# Patient Record
Sex: MALE | Race: BLACK OR AFRICAN AMERICAN | NOT HISPANIC OR LATINO | Employment: FULL TIME | URBAN - METROPOLITAN AREA
[De-identification: names, ages, dates, MRNs, and addresses within clinical notes are randomized per-mention and may not be internally consistent; named-entity substitution may affect disease eponyms.]

---

## 2020-08-17 ENCOUNTER — HOSPITAL ENCOUNTER (EMERGENCY)
Facility: HOSPITAL | Age: 30
Discharge: HOME | End: 2020-08-17
Attending: EMERGENCY MEDICINE
Payer: MEDICAID

## 2020-08-17 VITALS
TEMPERATURE: 98 F | HEART RATE: 75 BPM | OXYGEN SATURATION: 98 % | RESPIRATION RATE: 18 BRPM | SYSTOLIC BLOOD PRESSURE: 119 MMHG | WEIGHT: 162.38 LBS | DIASTOLIC BLOOD PRESSURE: 70 MMHG

## 2020-08-17 DIAGNOSIS — M25.562 ACUTE PAIN OF LEFT KNEE: Primary | ICD-10-CM

## 2020-08-17 PROCEDURE — 99283 EMERGENCY DEPT VISIT LOW MDM: CPT

## 2020-08-17 RX ORDER — NAPROXEN 500 MG/1
500 TABLET ORAL 2 TIMES DAILY WITH MEALS
Qty: 20 TABLET | Refills: 0 | Status: SHIPPED | OUTPATIENT
Start: 2020-08-17 | End: 2023-06-11 | Stop reason: ALTCHOICE

## 2020-08-17 NOTE — ED PROVIDER NOTES
History      Chief Complaint   Patient presents with    Knee Pain     c/o L knee pain and swelling for the last week        Review of patient's allergies indicates:  No Known Allergies     HPI   HPI    8/17/2020, 9:03 AM   History obtained from the patient      History of Present Illness: Brooke Harvey is a 29 y.o. male patient who presents to the Emergency Department for left knee pain and swelling for a week.  Denies injury.  Denies fever.  Symptoms are moderate in severity.     No further complaints or concerns at this time.           PCP: Primary Doctor No       Past Medical History:  No past medical history on file.      Past Surgical History:  No past surgical history on file.        Family History:  No family history on file.        Social History:  Social History     Tobacco Use    Smoking status: Not on file   Substance and Sexual Activity    Alcohol use: Not on file    Drug use: Not on file    Sexual activity: Not on file       ROS     Review of Systems   Constitutional: Negative for chills and fever.   HENT: Negative for facial swelling and trouble swallowing.    Eyes: Negative for pain and discharge.   Respiratory: Negative for chest tightness and shortness of breath.    Cardiovascular: Negative for palpitations and leg swelling.   Gastrointestinal: Negative for diarrhea and vomiting.   Endocrine: Negative for polydipsia and polyuria.   Genitourinary: Negative for decreased urine volume and flank pain.   Musculoskeletal: Negative for joint swelling and neck stiffness.   Skin: Negative for rash and wound.   Neurological: Negative for syncope and light-headedness.   All other systems reviewed and are negative.      Physical Exam      Initial Vitals [08/17/20 0855]   BP Pulse Resp Temp SpO2   119/70 75 18 98 °F (36.7 °C) 98 %      MAP       --         Physical Exam  Vital signs and nursing notes reviewed.  Constitutional: Patient is in NAD. Awake and alert. Well-developed and  well-nourished.  Head: Atraumatic. Normocephalic.  Eyes: PERRL. EOM intact. Conjunctivae nl. No scleral icterus.  ENT: Mucous membranes are moist. Oropharynx is clear.  Neck: Supple. No JVD. No lymphadenopathy.  No meningismus  Cardiovascular: Regular rate and rhythm. No murmurs, rubs, or gallops. Distal pulses are 2+ and symmetric.  Pulmonary/Chest: No respiratory distress. Clear to auscultation bilaterally. No wheezing, rales, or rhonchi.  Abdominal: Soft. Non-distended. No TTP. No rebound, guarding, or rigidity. Good bowel sounds.  Genitourinary: No CVA tenderness  Musculoskeletal: Moves all extremities.  Left knee with no erythema, edema or heat.  FROM.  No laxity.  No tenderness.   2+DP pulses.  Hip and ankle with from and nontender.  Skin: Warm and dry.  Neurological: Awake and alert. No acute focal neurological deficits are appreciated.  Psychiatric: Normal affect. Good eye contact. Appropriate in content.      ED Course          Procedures  ED Vital Signs:  Vitals:    08/17/20 0855   BP: 119/70   Pulse: 75   Resp: 18   Temp: 98 °F (36.7 °C)   SpO2: 98%   Weight: 73.6 kg (162 lb 5.9 oz)                 Imaging Results:  Imaging Results    None            The Emergency Provider reviewed the vital signs and test results, which are outlined above.    ED Discussion             Medication(s) given in the ER:  Medications - No data to display        Follow-up Information     [facility] In 3 days.    Contact information:  [address] [place] [address] [phone]                       New Prescriptions    NAPROXEN (NAPROSYN) 500 MG TABLET    Take 1 tablet (500 mg total) by mouth 2 (two) times daily with meals. Prn pain          Medical Decision Making        All findings were reviewed with the patient/family in detail.   All remaining questions and concerns were addressed at that time.  Patient/family has been counseled regarding the need for follow-up as well as the indication to return  to the emergency room should new or worrisome developments occur.        MDM               Clinical Impression:        ICD-10-CM ICD-9-CM   1. Acute pain of left knee  M25.562 719.46             Christine Ramirez PA-C  08/17/20 0906

## 2020-08-18 ENCOUNTER — PES CALL (OUTPATIENT)
Dept: ADMINISTRATIVE | Facility: CLINIC | Age: 30
End: 2020-08-18

## 2021-03-31 ENCOUNTER — IMMUNIZATION (OUTPATIENT)
Dept: INTERNAL MEDICINE | Facility: CLINIC | Age: 31
End: 2021-03-31
Payer: MEDICAID

## 2021-03-31 DIAGNOSIS — Z23 NEED FOR VACCINATION: Primary | ICD-10-CM

## 2021-03-31 PROCEDURE — 91300 COVID-19, MRNA, LNP-S, PF, 30 MCG/0.3 ML DOSE VACCINE: CPT | Mod: PBBFAC | Performed by: FAMILY MEDICINE

## 2021-04-21 ENCOUNTER — IMMUNIZATION (OUTPATIENT)
Dept: INTERNAL MEDICINE | Facility: CLINIC | Age: 31
End: 2021-04-21
Payer: MEDICAID

## 2021-04-21 DIAGNOSIS — Z23 NEED FOR VACCINATION: Primary | ICD-10-CM

## 2021-04-21 PROCEDURE — 0002A COVID-19, MRNA, LNP-S, PF, 30 MCG/0.3 ML DOSE VACCINE: CPT | Mod: PBBFAC

## 2021-04-21 PROCEDURE — 91300 COVID-19, MRNA, LNP-S, PF, 30 MCG/0.3 ML DOSE VACCINE: CPT | Mod: PBBFAC

## 2022-01-27 ENCOUNTER — HISTORICAL (OUTPATIENT)
Dept: RADIOLOGY | Facility: HOSPITAL | Age: 32
End: 2022-01-27

## 2022-02-02 ENCOUNTER — HISTORICAL (OUTPATIENT)
Dept: ADMINISTRATIVE | Facility: HOSPITAL | Age: 32
End: 2022-02-02

## 2022-02-09 ENCOUNTER — HISTORICAL (OUTPATIENT)
Dept: ADMINISTRATIVE | Facility: HOSPITAL | Age: 32
End: 2022-02-09

## 2022-07-16 ENCOUNTER — HOSPITAL ENCOUNTER (EMERGENCY)
Facility: HOSPITAL | Age: 32
Discharge: HOME | End: 2022-07-17
Attending: FAMILY MEDICINE
Payer: MEDICAID

## 2022-07-16 DIAGNOSIS — N30.00 ACUTE CYSTITIS WITHOUT HEMATURIA: Primary | ICD-10-CM

## 2022-07-16 DIAGNOSIS — K62.5 RECTAL BLEEDING: ICD-10-CM

## 2022-07-16 LAB
ALBUMIN SERPL-MCNC: 3.9 GM/DL (ref 3.5–5)
ALBUMIN/GLOB SERPL: 1 RATIO (ref 1.1–2)
ALP SERPL-CCNC: 112 UNIT/L (ref 40–150)
ALT SERPL-CCNC: 44 UNIT/L (ref 0–55)
APTT PPP: 31 SECONDS
AST SERPL-CCNC: 50 UNIT/L (ref 5–34)
BASOPHILS # BLD AUTO: 0.02 X10(3)/MCL (ref 0–0.2)
BASOPHILS NFR BLD AUTO: 0.3 %
BILIRUBIN DIRECT+TOT PNL SERPL-MCNC: 0.5 MG/DL
BUN SERPL-MCNC: 11.8 MG/DL (ref 8.9–20.6)
CALCIUM SERPL-MCNC: 9.7 MG/DL (ref 8.4–10.2)
CHLORIDE SERPL-SCNC: 103 MMOL/L (ref 98–107)
CO2 SERPL-SCNC: 27 MMOL/L (ref 22–29)
CREAT SERPL-MCNC: 1.19 MG/DL (ref 0.73–1.18)
EOSINOPHIL # BLD AUTO: 0.27 X10(3)/MCL (ref 0–0.9)
EOSINOPHIL NFR BLD AUTO: 3.9 %
ERYTHROCYTE [DISTWIDTH] IN BLOOD BY AUTOMATED COUNT: 12.8 % (ref 11.5–17)
GLOBULIN SER-MCNC: 4 GM/DL (ref 2.4–3.5)
GLUCOSE SERPL-MCNC: 94 MG/DL (ref 74–100)
HCT VFR BLD AUTO: 43.8 % (ref 42–52)
HGB BLD-MCNC: 13.3 GM/DL (ref 14–18)
IMM GRANULOCYTES # BLD AUTO: 0.02 X10(3)/MCL (ref 0–0.04)
IMM GRANULOCYTES NFR BLD AUTO: 0.3 %
LYMPHOCYTES # BLD AUTO: 2.09 X10(3)/MCL (ref 0.6–4.6)
LYMPHOCYTES NFR BLD AUTO: 30 %
MCH RBC QN AUTO: 25.8 PG (ref 27–31)
MCHC RBC AUTO-ENTMCNC: 30.4 MG/DL (ref 33–36)
MCV RBC AUTO: 84.9 FL (ref 80–94)
MONOCYTES # BLD AUTO: 0.49 X10(3)/MCL (ref 0.1–1.3)
MONOCYTES NFR BLD AUTO: 7 %
NEUTROPHILS # BLD AUTO: 4.1 X10(3)/MCL (ref 2.1–9.2)
NEUTROPHILS NFR BLD AUTO: 58.5 %
NRBC BLD AUTO-RTO: 0 %
PLATELET # BLD AUTO: 239 X10(3)/MCL (ref 130–400)
PMV BLD AUTO: 10.5 FL (ref 7.4–10.4)
POTASSIUM SERPL-SCNC: 4 MMOL/L (ref 3.5–5.1)
PROT SERPL-MCNC: 7.9 GM/DL (ref 6.4–8.3)
RBC # BLD AUTO: 5.16 X10(6)/MCL (ref 4.7–6.1)
SODIUM SERPL-SCNC: 142 MMOL/L (ref 136–145)
WBC # SPEC AUTO: 7 X10(3)/MCL (ref 4.5–11.5)

## 2022-07-16 PROCEDURE — 85610 PROTHROMBIN TIME: CPT | Performed by: FAMILY MEDICINE

## 2022-07-16 PROCEDURE — 36415 COLL VENOUS BLD VENIPUNCTURE: CPT | Performed by: FAMILY MEDICINE

## 2022-07-16 PROCEDURE — 25000003 PHARM REV CODE 250: Performed by: FAMILY MEDICINE

## 2022-07-16 PROCEDURE — 96360 HYDRATION IV INFUSION INIT: CPT | Mod: 59

## 2022-07-16 PROCEDURE — 85025 COMPLETE CBC W/AUTO DIFF WBC: CPT | Performed by: FAMILY MEDICINE

## 2022-07-16 PROCEDURE — 99285 EMERGENCY DEPT VISIT HI MDM: CPT | Mod: 25

## 2022-07-16 PROCEDURE — 85730 THROMBOPLASTIN TIME PARTIAL: CPT | Performed by: FAMILY MEDICINE

## 2022-07-16 PROCEDURE — 80053 COMPREHEN METABOLIC PANEL: CPT | Performed by: FAMILY MEDICINE

## 2022-07-16 RX ADMIN — SODIUM CHLORIDE 1000 ML: 9 INJECTION, SOLUTION INTRAVENOUS at 11:07

## 2022-07-17 VITALS
BODY MASS INDEX: 26.73 KG/M2 | SYSTOLIC BLOOD PRESSURE: 131 MMHG | DIASTOLIC BLOOD PRESSURE: 86 MMHG | HEIGHT: 68 IN | WEIGHT: 176.38 LBS | OXYGEN SATURATION: 99 % | RESPIRATION RATE: 20 BRPM | HEART RATE: 94 BPM | TEMPERATURE: 99 F

## 2022-07-17 VITALS
BODY MASS INDEX: 26.73 KG/M2 | TEMPERATURE: 97 F | HEIGHT: 68 IN | DIASTOLIC BLOOD PRESSURE: 82 MMHG | SYSTOLIC BLOOD PRESSURE: 137 MMHG | RESPIRATION RATE: 20 BRPM | OXYGEN SATURATION: 97 % | WEIGHT: 176.38 LBS | HEART RATE: 98 BPM

## 2022-07-17 DIAGNOSIS — R10.32 LEFT LOWER QUADRANT ABDOMINAL PAIN: Primary | ICD-10-CM

## 2022-07-17 DIAGNOSIS — N30.00 ACUTE CYSTITIS WITHOUT HEMATURIA: ICD-10-CM

## 2022-07-17 LAB
AMPHET UR QL SCN: NEGATIVE
APPEARANCE UR: CLEAR
BACTERIA #/AREA URNS AUTO: ABNORMAL /HPF
BARBITURATE SCN PRESENT UR: NEGATIVE
BENZODIAZ UR QL SCN: NEGATIVE
BILIRUB UR QL STRIP.AUTO: NEGATIVE MG/DL
CANNABINOIDS UR QL SCN: NEGATIVE
COCAINE UR QL SCN: NEGATIVE
COLOR UR AUTO: YELLOW
FENTANYL UR QL SCN: NEGATIVE
GLUCOSE UR QL STRIP.AUTO: NORMAL MG/DL
HYALINE CASTS #/AREA URNS LPF: ABNORMAL /LPF
KETONES UR QL STRIP.AUTO: NEGATIVE MG/DL
LEUKOCYTE ESTERASE UR QL STRIP.AUTO: 500 UNIT/L
MDMA UR QL SCN: NEGATIVE
MUCOUS THREADS URNS QL MICRO: ABNORMAL /LPF
NITRITE UR QL STRIP.AUTO: NEGATIVE
OPIATES UR QL SCN: NEGATIVE
PCP UR QL: NEGATIVE
PH UR STRIP.AUTO: 6.5 [PH]
PH UR: 6.5 [PH] (ref 3–11)
PROT UR QL STRIP.AUTO: ABNORMAL MG/DL
RBC #/AREA URNS AUTO: ABNORMAL /HPF
RBC UR QL AUTO: NEGATIVE UNIT/L
SP GR UR STRIP.AUTO: 1.02
SPECIFIC GRAVITY, URINE AUTO (.000) (OHS): 1.02 (ref 1–1.03)
SQUAMOUS #/AREA URNS LPF: ABNORMAL /HPF
UROBILINOGEN UR STRIP-ACNC: ABNORMAL MG/DL
WBC #/AREA URNS AUTO: ABNORMAL /HPF

## 2022-07-17 PROCEDURE — 81001 URINALYSIS AUTO W/SCOPE: CPT | Performed by: FAMILY MEDICINE

## 2022-07-17 PROCEDURE — 63600175 PHARM REV CODE 636 W HCPCS: Performed by: FAMILY MEDICINE

## 2022-07-17 PROCEDURE — 25500020 PHARM REV CODE 255: Performed by: FAMILY MEDICINE

## 2022-07-17 PROCEDURE — 99284 EMERGENCY DEPT VISIT MOD MDM: CPT | Mod: 25

## 2022-07-17 PROCEDURE — 87077 CULTURE AEROBIC IDENTIFY: CPT | Performed by: FAMILY MEDICINE

## 2022-07-17 PROCEDURE — 25000003 PHARM REV CODE 250

## 2022-07-17 PROCEDURE — 96372 THER/PROPH/DIAG INJ SC/IM: CPT | Performed by: FAMILY MEDICINE

## 2022-07-17 PROCEDURE — 80307 DRUG TEST PRSMV CHEM ANLYZR: CPT | Performed by: FAMILY MEDICINE

## 2022-07-17 RX ORDER — CEFTRIAXONE 500 MG/1
500 INJECTION, POWDER, FOR SOLUTION INTRAMUSCULAR; INTRAVENOUS
Status: COMPLETED | OUTPATIENT
Start: 2022-07-17 | End: 2022-07-17

## 2022-07-17 RX ORDER — NAPROXEN 500 MG/1
500 TABLET ORAL 2 TIMES DAILY PRN
Qty: 20 TABLET | Refills: 0 | Status: SHIPPED | OUTPATIENT
Start: 2022-07-17 | End: 2022-07-27

## 2022-07-17 RX ORDER — MAG HYDROX/ALUMINUM HYD/SIMETH 200-200-20
30 SUSPENSION, ORAL (FINAL DOSE FORM) ORAL
Status: DISCONTINUED | OUTPATIENT
Start: 2022-07-17 | End: 2022-07-17 | Stop reason: HOSPADM

## 2022-07-17 RX ORDER — LIDOCAINE HYDROCHLORIDE 20 MG/ML
INJECTION, SOLUTION EPIDURAL; INFILTRATION; INTRACAUDAL; PERINEURAL
Status: COMPLETED
Start: 2022-07-17 | End: 2022-07-17

## 2022-07-17 RX ORDER — DOXYCYCLINE 100 MG/1
100 CAPSULE ORAL 2 TIMES DAILY
Qty: 14 CAPSULE | Refills: 0 | Status: SHIPPED | OUTPATIENT
Start: 2022-07-17 | End: 2022-07-24

## 2022-07-17 RX ORDER — LIDOCAINE HYDROCHLORIDE 20 MG/ML
10 SOLUTION OROPHARYNGEAL
Status: DISCONTINUED | OUTPATIENT
Start: 2022-07-17 | End: 2022-07-17 | Stop reason: HOSPADM

## 2022-07-17 RX ORDER — DICYCLOMINE HYDROCHLORIDE 10 MG/ML
20 INJECTION INTRAMUSCULAR
Status: DISCONTINUED | OUTPATIENT
Start: 2022-07-17 | End: 2022-07-17 | Stop reason: HOSPADM

## 2022-07-17 RX ADMIN — CEFTRIAXONE SODIUM 500 MG: 500 INJECTION, POWDER, FOR SOLUTION INTRAMUSCULAR; INTRAVENOUS at 01:07

## 2022-07-17 RX ADMIN — LIDOCAINE HYDROCHLORIDE 100 MG: 20 INJECTION, SOLUTION EPIDURAL; INFILTRATION; INTRACAUDAL; PERINEURAL at 01:07

## 2022-07-17 RX ADMIN — IOPAMIDOL 100 ML: 755 INJECTION, SOLUTION INTRAVENOUS at 12:07

## 2022-07-17 NOTE — ED PROVIDER NOTES
Encounter Date: 7/17/2022       History     Chief Complaint   Patient presents with    Abdominal Pain     Pt reports riding his bike home after being dc'd earlier tonight and reports vomiting and abd pain, pt called [facility] and is back, vss.      31-year-old gentleman presents emergency room with left lower quadrant abdominal pain.  Patient was just discharged the emergency room, but reports that the pain returned, so therefore wanted to be re-evaluated.  Currently reports pain is moderate.  No further bloody bowel movements.  Currently in no acute distress.        Review of patient's allergies indicates:  No Known Allergies  Past Medical History:   Diagnosis Date    Anxiety     Depression      No past surgical history on file.  No family history on file.  Social History     Tobacco Use    Smoking status: Never Smoker    Smokeless tobacco: Never Used   Substance Use Topics    Alcohol use: Never    Drug use: Never     Review of Systems   Constitutional: Negative for chills, fatigue and fever.   HENT: Negative for ear pain, rhinorrhea and sore throat.    Eyes: Negative for photophobia and pain.   Respiratory: Negative for cough, shortness of breath and wheezing.    Cardiovascular: Negative for chest pain.   Gastrointestinal: Positive for abdominal pain and nausea. Negative for diarrhea and vomiting.   Genitourinary: Negative for dysuria.   Neurological: Negative for dizziness, weakness and headaches.   All other systems reviewed and are negative.      Physical Exam     Initial Vitals [07/17/22 0320]   BP Pulse Resp Temp SpO2   137/82 98 20 97 °F (36.1 °C) 97 %      MAP       --         Physical Exam    Nursing note and vitals reviewed.  Constitutional: He appears well-developed and well-nourished.   HENT:   Head: Normocephalic and atraumatic.   Eyes: EOM are normal. Pupils are equal, round, and reactive to light.   Neck: Neck supple.   Normal range of motion.  Cardiovascular: Normal rate, regular rhythm and  normal heart sounds. Exam reveals no gallop and no friction rub.    No murmur heard.  Pulmonary/Chest: Breath sounds normal. No respiratory distress.   Abdominal: Abdomen is soft. Bowel sounds are normal. He exhibits no distension. There is no abdominal tenderness.   Very mild left lower quadrant abdominal tenderness, no rebound or guarding   Musculoskeletal:         General: Normal range of motion.      Cervical back: Normal range of motion and neck supple.     Neurological: He is alert and oriented to person, place, and time. He has normal strength.   Skin: Skin is warm and dry.   Psychiatric: He has a normal mood and affect. His behavior is normal. Judgment and thought content normal.         ED Course   Procedures  Labs Reviewed - No data to display       Imaging Results    None          Medications   aluminum-magnesium hydroxide-simethicone 200-200-20 mg/5 mL suspension 30 mL (has no administration in time range)   LIDOcaine HCl 2% oral solution 10 mL (has no administration in time range)   dicyclomine injection 20 mg (has no administration in time range)     Medical Decision Making:   Initial Assessment:   Discussed the patient regarding the findings.  No acute abnormalities noted on physical examination with the exception of mild left lower quadrant abdominal tenderness.  No rebound or guarding.  Patient was just discharged had a full set of laboratory evaluation and also a CT scan which was considered normal.  Discussed with the patient, and patient reports that he just wants some additional medicine.  Will give patient a dose of Bentyl here in the emergency room.  Patient also being treated for urinary tract infection, possibly from gonorrhea/chlamydia.  Given Rocephin here in the emergency room, and has a prescription for the doxycycline.  Patient reports understanding.                      Clinical Impression:   Final diagnoses:  [R10.32] Left lower quadrant abdominal pain (Primary)  [N30.00] Acute  cystitis without hematuria          ED Disposition Condition    Discharge Stable        ED Prescriptions     None        Follow-up Information     Follow up With Specialties Details Why Contact Info    [facility] - Emergency Dept Emergency Medicine  As needed, If symptoms worsen [address] [facility] [address] [phone]    Primary Care Physician  In 5 days             Titus Stoddard MD  07/17/22 9000

## 2022-07-17 NOTE — ED PROVIDER NOTES
"Encounter Date: 7/16/2022       History     Chief Complaint   Patient presents with    GI Problem     Pt reports passing blood while having a bowel movement around 2115 this evening, reports " a larger amount, when I saw it I passed out", pt reports he was found by his roommate and then came here, pt reports he did not hit his head or fall when he passed out but was able to get to the floor. Pt reports this has happened before several years ago, pt vss.     Dysuria     Pt reports unable to urinate, this began at the same time he had the bm.      31-year-old gentleman presents emergency room with complaints of GI bleeding.  Patient reports that he began noticing bleeding last night when having a bowel movement.  Patient has not had another bowel movement until tonight (not having multiple bowel movements per day).  Patient reports not seen any blood clots, but only seen red in the toilet bowl.  Patient denies any chronic medical problems.  Does report a history of back surgery in the past.  Patient reports that tonight around 915 he went to the bathroom, and felt lightheaded and weak, sustaining a syncopal episode.  Only lasted a few minutes.  Patient came to the emergency room after this occurred.  Denies any nausea or vomiting, retained a good appetite.  Patient currently reports that he feels weak and fatigued, but able to stand and ambulate without difficulty.  Patient does report having mild abdominal pain, mostly right lower quadrant.  Denies fever or chills.        Review of patient's allergies indicates:  No Known Allergies  Past Medical History:   Diagnosis Date    Anxiety     Depression      History reviewed. No pertinent surgical history.  History reviewed. No pertinent family history.  Social History     Tobacco Use    Smoking status: Never Smoker    Smokeless tobacco: Never Used   Substance Use Topics    Alcohol use: Never    Drug use: Never     Review of Systems   Constitutional: Negative for " fever.   HENT: Negative for sore throat.    Respiratory: Negative for shortness of breath.    Cardiovascular: Negative for chest pain.   Gastrointestinal: Positive for abdominal pain and blood in stool. Negative for nausea.   Genitourinary: Positive for difficulty urinating. Negative for dysuria.        Patient reports difficulty urinating this evening as well as some dysuria.   Musculoskeletal: Negative for back pain.   Skin: Negative for rash.   Neurological: Negative for weakness.   Hematological: Does not bruise/bleed easily.   All other systems reviewed and are negative.      Physical Exam     Initial Vitals [07/16/22 2202]   BP Pulse Resp Temp SpO2   131/86 95 18 98.6 °F (37 °C) 99 %      MAP       --         Physical Exam    Nursing note and vitals reviewed.  Constitutional: He appears well-developed and well-nourished.   HENT:   Head: Normocephalic and atraumatic.   Eyes: EOM are normal. Pupils are equal, round, and reactive to light.   Neck: Neck supple.   Normal range of motion.  Cardiovascular: Normal rate, regular rhythm and normal heart sounds. Exam reveals no gallop and no friction rub.    No murmur heard.  Pulmonary/Chest: Breath sounds normal. No respiratory distress.   Abdominal: Abdomen is soft. Bowel sounds are normal. He exhibits no distension. There is no abdominal tenderness.   Musculoskeletal:         General: Normal range of motion.      Cervical back: Normal range of motion and neck supple.     Neurological: He is alert and oriented to person, place, and time. He has normal strength.   Skin: Skin is warm and dry.   Psychiatric: He has a normal mood and affect. His behavior is normal. Judgment and thought content normal.         ED Course   Procedures  Labs Reviewed   COMPREHENSIVE METABOLIC PANEL - Abnormal; Notable for the following components:       Result Value    Creatinine 1.19 (*)     Globulin 4.0 (*)     Albumin/Globulin Ratio 1.0 (*)     Aspartate Aminotransferase 50 (*)     All  other components within normal limits   URINALYSIS, REFLEX TO URINE CULTURE - Abnormal; Notable for the following components:    Protein, UA 1+ (*)     Urobilinogen, UA 3+ (*)     Leukocyte Esterase,   (*)     WBC, UA 51-99 (*)     Bacteria, UA Occ (*)     Squamous Epithelial Cells, UA Trace (*)     Mucous, UA Trace (*)     All other components within normal limits   CBC WITH DIFFERENTIAL - Abnormal; Notable for the following components:    Hgb 13.3 (*)     MCH 25.8 (*)     MCHC 30.4 (*)     MPV 10.5 (*)     All other components within normal limits   DRUG SCREEN, URINE (BEAKER) - Normal    Narrative:     Cut off concentrations:    Amphetamines - 1000 ng/ml  Barbiturates - 200 ng/ml  Benzodiazepine - 200 ng/ml  Cannabinoids (THC) - 50 ng/ml  Cocaine - 300 ng/ml  Fentanyl - 1.0 ng/ml  MDMA - 500 ng/ml  Opiates - 300 ng/ml   Phencyclidine (PCP) - 25 ng/ml    Specimen submitted for drug analysis and tested for pH and specific gravity in order to evaluate sample integrity. Suspect tampering if specific gravity is <1.003 and/or pH is not within the range of 4.5 - 8.0  False negatives may result form substances such as bleach added to urine.  False positives may result for the presence of a substance with similar chemical structure to the drug or its metabolite.    This test provides only a PRELIMINARY analytical test result. A more specific alternate chemical method must be used in order to obtain a confirmed analytical result. Gas chromatography/mass spectrometry (GC/MS) is the preferred confirmatory method. Other chemical confirmation methods are available. Clinical consideration and professional judgement should be applied to any drug of abuse test result, particularly when preliminary positive results are used.    Positive results will be confirmed only at the physicians request. Unconfirmed screening results are to be used only for medical purposes (treatment).        APTT - Normal   CULTURE, URINE    CHLAMYDIA/GONORRHOEAE(GC), PCR   CBC W/ AUTO DIFFERENTIAL    Narrative:     The following orders were created for panel order CBC Auto Differential.  Procedure                               Abnormality         Status                     ---------                               -----------         ------                     CBC with Differential[[id number]]        Abnormal            Final result                 Please view results for these tests on the individual orders.   PROTIME-INR   EXTRA TUBES    Narrative:     The following orders were created for panel order EXTRA TUBES.  Procedure                               Abnormality         Status                     ---------                               -----------         ------                     Red Top Hold[[id number]]                                     In process                 Pink Top Hold[[id number]]                                    In process                   Please view results for these tests on the individual orders.   RED TOP HOLD   PINK TOP HOLD          Imaging Results          CT Abdomen Pelvis With Contrast (Preliminary result)  Result time 07/17/22 00:11:53    Preliminary result by Kojo Sloan MD (07/17/22 00:11:53)                 Narrative:    START OF REPORT:  Technique: CT of the abdomen and pelvis was performed with axial images as well as sagittal and coronal reconstruction images with intravenous contrast.    Comparison: None available.    Clinical History: Abdominal pain, right lower, GI bleeding.    Dosage Information: Automated Exposure Control was utilized 411.94 mGy.cm.    Findings:  Thorax:  Lungs: There is mild nonspecific dependent change at the lung bases.  Pleura: No effusions or thickening.  Heart: The heart size is within normal limits.  Abdomen:  Abdominal Wall: There is a small uncomplicated umbilical hernia which contains mesenteric fat.  Liver: The liver appears unremarkable.  Biliary System: No intrahepatic or  extrahepatic biliary duct dilatation is seen.  Gallbladder: The gallbladder appears unremarkable.  Pancreas: The pancreas appears unremarkable.  Spleen: The spleen appears unremarkable.  Adrenals: The adrenal glands appear unremarkable.  Kidneys: The kidneys appear unremarkable with no stones cysts masses or hydronephrosis.  Aorta: The abdominal aorta appears unremarkable.  IVC: Unremarkable.  Bowel:  Esophagus: The visualized esophagus appears unremarkable.  Stomach: The stomach appears unremarkable.  Duodenum: Unremarkable appearing duodenum.  Small Bowel: The small bowel appears unremarkable.  Colon: Nondistended.  Appendix: The appendix appears unremarkable (series 2; images 69-78).  Peritoneum: No intraperitoneal free air or ascites is seen.    Pelvis:  Bladder: The bladder is nondistended but appears otherwise unremarkable.  Male:  Prostate gland: The prostate gland appears unremarkable.    Bony structures:  Dorsal Spine: The visualized dorsal spine appears unremarkable.  Bony Pelvis: The visualized bony structures of the pelvis appear unremarkable.      Impression:  1. No acute intraabdominal or pelvic solid organ or bowel pathology identified. Details and findings as discussed.                                   Medications   cefTRIAXone injection 500 mg (has no administration in time range)   sodium chloride 0.9% bolus 1,000 mL (0 mLs Intravenous Stopped 7/17/22 0018)   iopamidoL (ISOVUE-370) injection 100 mL (100 mLs Intravenous Given 7/17/22 0020)     Medical Decision Making:   Initial Assessment:   Patient currently has normal vital signs, appears in no acute distress.  Will obtain laboratory evaluation and likely a CT scan of the abdomen pelvis for further evaluation.             ED Course as of 07/17/22 0132   Sat Jul 16, 2022 2254 WBC: 7.0 [MW]   2254 Hemoglobin(!): 13.3 [MW]   2254 Hematocrit: 43.8 [MW]   2254 Platelets: 239 [MW]   2259 Sodium: 142 [MW]   2259 Potassium: 4.0 [MW]   2259 BUN: 11.8  [MW]   2259 Creatinine(!): 1.19 [MW]   2259 Albumin: 3.9 [MW]   2259 ALT: 44 [MW]   2259 AST(!): 50 [MW]   2259 eGFR if : >60 [MW]   Sun Jul 17, 2022   0128 Patient currently in no acute distress bili fairly improved.  Discussed results with the patient.  On urinalysis, patient has many leukocyte esterase and white blood cells with occasional bacteria.  No red blood cells in the urine.  Findings possibly related to a sexually transmitted disease.  Will order a urine GC/chlamydia for further evaluation, but will treat with Rocephin and doxycycline [MW]      ED Course User Index  [MW] Titus Stoddard MD             Clinical Impression:   Final diagnoses:  [N30.00] Acute cystitis without hematuria (Primary)  [K62.5] Rectal bleeding          ED Disposition Condition    Discharge Stable        ED Prescriptions     Medication Sig Dispense Start Date End Date Auth. Provider    doxycycline (VIBRAMYCIN) 100 MG Cap Take 1 capsule (100 mg total) by mouth 2 (two) times daily. for 7 days 14 capsule 7/17/2022 7/24/2022 Titus Stdodard MD    naproxen (NAPROSYN) 500 MG tablet Take 1 tablet (500 mg total) by mouth 2 (two) times daily as needed (pain). 20 tablet 7/17/2022 7/27/2022 Titus Stoddard MD        Follow-up Information     Follow up With Specialties Details Why Contact Info    [facility] - Emergency Dept Emergency Medicine  As needed, If symptoms worsen [address] [address] [phone]    Primary Care Physician  In 5 days             Titus Stoddard MD  07/17/22 0132       Titus Stoddard MD  07/17/22 0132

## 2022-07-20 ENCOUNTER — TELEPHONE (OUTPATIENT)
Dept: EMERGENCY MEDICINE | Facility: HOSPITAL | Age: 32
End: 2022-07-20
Payer: MEDICAID

## 2022-07-21 LAB
BACTERIA UR CULT: ABNORMAL
BACTERIA UR CULT: ABNORMAL

## 2022-08-12 ENCOUNTER — HOSPITAL ENCOUNTER (EMERGENCY)
Facility: HOSPITAL | Age: 32
Discharge: LEFT BEFORE DISCHARGE | End: 2022-08-12
Payer: MEDICAID

## 2022-08-12 VITALS
WEIGHT: 175.06 LBS | HEIGHT: 68 IN | SYSTOLIC BLOOD PRESSURE: 120 MMHG | DIASTOLIC BLOOD PRESSURE: 72 MMHG | RESPIRATION RATE: 18 BRPM | HEART RATE: 118 BPM | BODY MASS INDEX: 26.53 KG/M2 | TEMPERATURE: 99 F

## 2022-08-12 DIAGNOSIS — R10.9 ABDOMINAL PAIN: ICD-10-CM

## 2022-08-12 LAB
ALBUMIN SERPL-MCNC: 3.8 GM/DL (ref 3.5–5)
ALBUMIN/GLOB SERPL: 0.9 RATIO (ref 1.1–2)
ALP SERPL-CCNC: 118 UNIT/L (ref 40–150)
ALT SERPL-CCNC: 30 UNIT/L (ref 0–55)
APPEARANCE UR: ABNORMAL
AST SERPL-CCNC: 30 UNIT/L (ref 5–34)
BACTERIA #/AREA URNS AUTO: ABNORMAL /HPF
BASOPHILS # BLD AUTO: 0.02 X10(3)/MCL (ref 0–0.2)
BASOPHILS NFR BLD AUTO: 0.2 %
BILIRUB UR QL STRIP.AUTO: NEGATIVE MG/DL
BILIRUBIN DIRECT+TOT PNL SERPL-MCNC: 0.7 MG/DL
BUN SERPL-MCNC: 8.7 MG/DL (ref 8.9–20.6)
CALCIUM SERPL-MCNC: 10 MG/DL (ref 8.4–10.2)
CHLORIDE SERPL-SCNC: 101 MMOL/L (ref 98–107)
CO2 SERPL-SCNC: 29 MMOL/L (ref 22–29)
COLOR UR AUTO: YELLOW
CREAT SERPL-MCNC: 1.01 MG/DL (ref 0.73–1.18)
EOSINOPHIL # BLD AUTO: 0.01 X10(3)/MCL (ref 0–0.9)
EOSINOPHIL NFR BLD AUTO: 0.1 %
ERYTHROCYTE [DISTWIDTH] IN BLOOD BY AUTOMATED COUNT: 13.2 % (ref 11.5–17)
GFR SERPLBLD CREATININE-BSD FMLA CKD-EPI: >60 MLS/MIN/1.73/M2
GLOBULIN SER-MCNC: 4.2 GM/DL (ref 2.4–3.5)
GLUCOSE SERPL-MCNC: 123 MG/DL (ref 74–100)
GLUCOSE UR QL STRIP.AUTO: NEGATIVE MG/DL
HCT VFR BLD AUTO: 39.7 % (ref 42–52)
HGB BLD-MCNC: 12.3 GM/DL (ref 14–18)
HYALINE CASTS #/AREA URNS LPF: ABNORMAL /LPF
IMM GRANULOCYTES # BLD AUTO: 0.02 X10(3)/MCL (ref 0–0.04)
IMM GRANULOCYTES NFR BLD AUTO: 0.2 %
KETONES UR QL STRIP.AUTO: NEGATIVE MG/DL
LEUKOCYTE ESTERASE UR QL STRIP.AUTO: 500 UNIT/L
LYMPHOCYTES # BLD AUTO: 1.45 X10(3)/MCL (ref 0.6–4.6)
LYMPHOCYTES NFR BLD AUTO: 12 %
MCH RBC QN AUTO: 26.5 PG (ref 27–31)
MCHC RBC AUTO-ENTMCNC: 31 MG/DL (ref 33–36)
MCV RBC AUTO: 85.6 FL (ref 80–94)
MONOCYTES # BLD AUTO: 0.58 X10(3)/MCL (ref 0.1–1.3)
MONOCYTES NFR BLD AUTO: 4.8 %
MUCOUS THREADS URNS QL MICRO: ABNORMAL /LPF
NEUTROPHILS # BLD AUTO: 10 X10(3)/MCL (ref 2.1–9.2)
NEUTROPHILS NFR BLD AUTO: 82.7 %
NITRITE UR QL STRIP.AUTO: ABNORMAL
NRBC BLD AUTO-RTO: 0 %
PH UR STRIP.AUTO: 6 [PH]
PLATELET # BLD AUTO: 259 X10(3)/MCL (ref 130–400)
PMV BLD AUTO: 10.9 FL (ref 7.4–10.4)
POTASSIUM SERPL-SCNC: 3.9 MMOL/L (ref 3.5–5.1)
PROT SERPL-MCNC: 8 GM/DL (ref 6.4–8.3)
PROT UR QL STRIP.AUTO: 2 MG/DL
RBC # BLD AUTO: 4.64 X10(6)/MCL (ref 4.7–6.1)
RBC #/AREA URNS AUTO: ABNORMAL /HPF
RBC UR QL AUTO: 3 UNIT/L
SODIUM SERPL-SCNC: 140 MMOL/L (ref 136–145)
SP GR UR STRIP.AUTO: 1.02
SQUAMOUS #/AREA URNS LPF: ABNORMAL /HPF
UROBILINOGEN UR STRIP-ACNC: 2 MG/DL
WBC # SPEC AUTO: 12 X10(3)/MCL (ref 4.5–11.5)
WBC #/AREA URNS AUTO: >=100 /HPF

## 2022-08-12 PROCEDURE — 99900041 HC LEFT WITHOUT BEING SEEN- EMERGENCY

## 2022-08-12 PROCEDURE — 87591 N.GONORRHOEAE DNA AMP PROB: CPT | Performed by: PHYSICIAN ASSISTANT

## 2022-08-12 PROCEDURE — 80053 COMPREHEN METABOLIC PANEL: CPT | Performed by: PHYSICIAN ASSISTANT

## 2022-08-12 PROCEDURE — 36415 COLL VENOUS BLD VENIPUNCTURE: CPT | Performed by: PHYSICIAN ASSISTANT

## 2022-08-12 PROCEDURE — 85025 COMPLETE CBC W/AUTO DIFF WBC: CPT | Performed by: PHYSICIAN ASSISTANT

## 2022-08-12 PROCEDURE — 87491 CHLMYD TRACH DNA AMP PROBE: CPT | Performed by: PHYSICIAN ASSISTANT

## 2022-08-12 PROCEDURE — 81001 URINALYSIS AUTO W/SCOPE: CPT | Performed by: PHYSICIAN ASSISTANT

## 2022-08-13 LAB
C TRACH DNA SPEC QL NAA+PROBE: NOT DETECTED
N GONORRHOEA DNA SPEC QL NAA+PROBE: NOT DETECTED

## 2022-08-16 ENCOUNTER — TELEPHONE (OUTPATIENT)
Dept: EMERGENCY MEDICINE | Facility: HOSPITAL | Age: 32
End: 2022-08-16
Payer: MEDICAID

## 2022-08-16 ENCOUNTER — HOSPITAL ENCOUNTER (EMERGENCY)
Facility: HOSPITAL | Age: 32
Discharge: HOME | End: 2022-08-16
Attending: STUDENT IN AN ORGANIZED HEALTH CARE EDUCATION/TRAINING PROGRAM
Payer: MEDICAID

## 2022-08-16 VITALS
TEMPERATURE: 99 F | RESPIRATION RATE: 18 BRPM | OXYGEN SATURATION: 98 % | WEIGHT: 150 LBS | HEART RATE: 84 BPM | DIASTOLIC BLOOD PRESSURE: 78 MMHG | SYSTOLIC BLOOD PRESSURE: 122 MMHG

## 2022-08-16 DIAGNOSIS — S32.435A CLOSED NONDISPLACED FRACTURE OF ANTERIOR COLUMN OF LEFT ACETABULUM, INITIAL ENCOUNTER: Primary | ICD-10-CM

## 2022-08-16 DIAGNOSIS — R52 PAIN: ICD-10-CM

## 2022-08-16 LAB — BACTERIA UR CULT: ABNORMAL

## 2022-08-16 PROCEDURE — 63600175 PHARM REV CODE 636 W HCPCS: Performed by: PHYSICIAN ASSISTANT

## 2022-08-16 PROCEDURE — 25000003 PHARM REV CODE 250: Performed by: PHYSICIAN ASSISTANT

## 2022-08-16 PROCEDURE — 99285 EMERGENCY DEPT VISIT HI MDM: CPT | Mod: 25

## 2022-08-16 PROCEDURE — 96374 THER/PROPH/DIAG INJ IV PUSH: CPT

## 2022-08-16 RX ORDER — HYDROCODONE BITARTRATE AND ACETAMINOPHEN 7.5; 325 MG/1; MG/1
1 TABLET ORAL EVERY 6 HOURS PRN
Status: DISCONTINUED | OUTPATIENT
Start: 2022-08-16 | End: 2022-08-16 | Stop reason: HOSPADM

## 2022-08-16 RX ORDER — KETOROLAC TROMETHAMINE 30 MG/ML
60 INJECTION, SOLUTION INTRAMUSCULAR; INTRAVENOUS
Status: DISCONTINUED | OUTPATIENT
Start: 2022-08-16 | End: 2022-08-16

## 2022-08-16 RX ORDER — KETOROLAC TROMETHAMINE 30 MG/ML
30 INJECTION, SOLUTION INTRAMUSCULAR; INTRAVENOUS ONCE
Status: COMPLETED | OUTPATIENT
Start: 2022-08-16 | End: 2022-08-16

## 2022-08-16 RX ORDER — IBUPROFEN 600 MG/1
600 TABLET ORAL EVERY 8 HOURS PRN
Qty: 21 TABLET | Refills: 0 | Status: SHIPPED | OUTPATIENT
Start: 2022-08-16 | End: 2022-08-23

## 2022-08-16 RX ORDER — HYDROCODONE BITARTRATE AND ACETAMINOPHEN 5; 325 MG/1; MG/1
1 TABLET ORAL EVERY 6 HOURS PRN
Qty: 16 TABLET | Refills: 0 | Status: SHIPPED | OUTPATIENT
Start: 2022-08-16 | End: 2022-08-20

## 2022-08-16 RX ADMIN — KETOROLAC TROMETHAMINE 30 MG: 30 INJECTION, SOLUTION INTRAMUSCULAR at 05:08

## 2022-08-16 RX ADMIN — HYDROCODONE BITARTRATE AND ACETAMINOPHEN 1 TABLET: 7.5; 325 TABLET ORAL at 04:08
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
## 2022-08-22 ENCOUNTER — OFFICE VISIT (OUTPATIENT)
Dept: ORTHOPEDICS | Facility: CLINIC | Age: 32
End: 2022-08-22
Payer: MEDICAID

## 2022-08-22 ENCOUNTER — HOSPITAL ENCOUNTER (OUTPATIENT)
Dept: RADIOLOGY | Facility: CLINIC | Age: 32
Discharge: HOME | End: 2022-08-22
Attending: ORTHOPAEDIC SURGERY
Payer: MEDICAID

## 2022-08-22 VITALS
WEIGHT: 149.94 LBS | HEART RATE: 84 BPM | DIASTOLIC BLOOD PRESSURE: 78 MMHG | RESPIRATION RATE: 18 BRPM | BODY MASS INDEX: 21.47 KG/M2 | HEIGHT: 70 IN | SYSTOLIC BLOOD PRESSURE: 122 MMHG

## 2022-08-22 DIAGNOSIS — S32.402A UNSPECIFIED FRACTURE OF LEFT ACETABULUM, INITIAL ENCOUNTER FOR CLOSED FRACTURE: ICD-10-CM

## 2022-08-22 DIAGNOSIS — S32.402A UNSPECIFIED FRACTURE OF LEFT ACETABULUM, INITIAL ENCOUNTER FOR CLOSED FRACTURE: Primary | ICD-10-CM

## 2022-08-22 PROCEDURE — 99204 OFFICE O/P NEW MOD 45 MIN: CPT | Mod: ,,, | Performed by: ORTHOPAEDIC SURGERY

## 2022-08-22 PROCEDURE — 3078F DIAST BP <80 MM HG: CPT | Mod: CPTII,,, | Performed by: ORTHOPAEDIC SURGERY

## 2022-08-22 PROCEDURE — 72170 XR PELVIS JUDET VIEWS: ICD-10-PCS | Mod: 26,,, | Performed by: ORTHOPAEDIC SURGERY

## 2022-08-22 PROCEDURE — 1159F MED LIST DOCD IN RCRD: CPT | Mod: CPTII,,, | Performed by: ORTHOPAEDIC SURGERY

## 2022-08-22 PROCEDURE — 3008F PR BODY MASS INDEX (BMI) DOCUMENTED: ICD-10-PCS | Mod: CPTII,,, | Performed by: ORTHOPAEDIC SURGERY

## 2022-08-22 PROCEDURE — 3008F BODY MASS INDEX DOCD: CPT | Mod: CPTII,,, | Performed by: ORTHOPAEDIC SURGERY

## 2022-08-22 PROCEDURE — 3078F PR MOST RECENT DIASTOLIC BLOOD PRESSURE < 80 MM HG: ICD-10-PCS | Mod: CPTII,,, | Performed by: ORTHOPAEDIC SURGERY

## 2022-08-22 PROCEDURE — 3074F SYST BP LT 130 MM HG: CPT | Mod: CPTII,,, | Performed by: ORTHOPAEDIC SURGERY

## 2022-08-22 PROCEDURE — 99204 PR OFFICE/OUTPT VISIT, NEW, LEVL IV, 45-59 MIN: ICD-10-PCS | Mod: ,,, | Performed by: ORTHOPAEDIC SURGERY

## 2022-08-22 PROCEDURE — 3074F PR MOST RECENT SYSTOLIC BLOOD PRESSURE < 130 MM HG: ICD-10-PCS | Mod: CPTII,,, | Performed by: ORTHOPAEDIC SURGERY

## 2022-08-22 PROCEDURE — 1159F PR MEDICATION LIST DOCUMENTED IN MEDICAL RECORD: ICD-10-PCS | Mod: CPTII,,, | Performed by: ORTHOPAEDIC SURGERY

## 2022-08-22 PROCEDURE — 72170 X-RAY EXAM OF PELVIS: CPT | Mod: 26,,, | Performed by: ORTHOPAEDIC SURGERY

## 2022-08-22 RX ORDER — CIPROFLOXACIN 500 MG/1
500 TABLET ORAL 2 TIMES DAILY
COMMUNITY
Start: 2022-08-16 | End: 2023-06-11 | Stop reason: ALTCHOICE

## 2022-08-22 RX ORDER — ALBUTEROL SULFATE 90 UG/1
AEROSOL, METERED RESPIRATORY (INHALATION)
COMMUNITY
Start: 2022-07-06 | End: 2023-06-11 | Stop reason: ALTCHOICE

## 2022-08-22 RX ORDER — NAPROXEN 500 MG/1
500 TABLET ORAL 2 TIMES DAILY PRN
COMMUNITY
Start: 2022-07-19 | End: 2023-09-28 | Stop reason: ALTCHOICE

## 2022-08-22 NOTE — PROGRESS NOTES
"Subjective:       Patient ID: Brooke Harvey is a 31 y.o. male.  Chief Complaint   Patient presents with    Pelvis - Injury     1 WEEK F/U FROM ER, LEFT ACETABULUM FX, PRESENTS TODAY FWB WITHOUT ASSISTANCE.        HPI:  Patient is 1 week out from a fall and follow-up in the ER.  Patient has a left anterior wall acetabular fracture nondisplaced.  Patient walks in the clinic without crutches.    Denies numbness tingling.  Appears to be quite comfortable.  It is dull achy pain in the groin with extremes of motion.  Better rest worse with increased ambulation    Patient is a lives in a sober living home unable take narcotic pain medication.  He is currently taking over-the-counter pain medication.  He does walk with a slight limp but is doing quite well.  States most of his pain is dull aching at night    ROS:  Constitutional: Denies fever chills  Eyes: No change in vision  ENT: No ringing or current infections  CV: No chest pain  Resp: No labored breathing  MSK: Pain evident at site of injury located in HPI,   Integ: No signs of abrasions or lacerations  Neuro: No numbness or tingling  Lymphatic: No swelling outside the area of injury     Current Outpatient Medications on File Prior to Visit   Medication Sig Dispense Refill    ibuprofen (ADVIL,MOTRIN) 600 MG tablet Take 1 tablet (600 mg total) by mouth every 8 (eight) hours as needed for Pain. 21 tablet 0     No current facility-administered medications on file prior to visit.          Objective:      /78   Pulse 84   Resp 18   Ht 5' 10" (1.778 m)   Wt 68 kg (149 lb 14.6 oz)   BMI 21.51 kg/m²   General the patient is alert and oriented x3 no acute distress nontoxic-appearing appropriate affect.    Constitutional: Vital signs are examined and stable.  Resp: No signs of labored breathing                 LLE: -Skin:  No signs of new abrasions or lacerations, no scars           -MSK: Hip and Knee F/E, EHL/FHL, Gastroc/Tib anterior Strength 5/5           " -Neuro:  Sensation intact to light touch L3-S1 dermatomes           -Lymphatic: No signs of lymphadenopathy           -CV: Capillary refill is less than 2 seconds. DP/PT pulses 2/4. Compartments soft and compressible                        Body mass index is 21.51 kg/m².  Ideal body weight: 73 kg (160 lb 15 oz)  No results found for: HGBA1C  Hgb   Date Value Ref Range Status   05/02/2019 12.7 (L) 14.0 - 18.0 gm/dL Final     No results found for: GZBHDGNX31MF  WBC   Date Value Ref Range Status   05/02/2019 7.1 4.5 - 11.5 x10(3)/mcL Final       Radiology:  Three views of the pelvis skeletally mature individual showing all minimally displaced left anterior wall acetabular fracture        Assessment:         1. Unspecified fracture of left acetabulum, initial encounter for closed fracture  X-Ray Pelvis Judet Views           Plan:         Patient has anterior wall acetabular fracture is nondisplaced amenable to nonoperative treatment.  Patient will be weight-bearing as tolerated.  Over-the-counter pain medication for comfort.  Will follow up in 6 weeks to assure fracture fragment has not moved her patient has instability about the hip.  Patient will unlikely need physical therapy.  Weightbearing as tolerated      No follow-ups on file.    Brooke was seen today for injury.    Diagnoses and all orders for this visit:    Unspecified fracture of left acetabulum, initial encounter for closed fracture  -     X-Ray Pelvis Judet Views; Future              This note/OR report was created with the assistance of  voice recognition software or phone  dictation.  There may be transcription errors as a result of using this technology however minimal. Effort has been made to assure accuracy of transcription but any obvious errors or omissions should be clarified with the author of the document.     Tushar Archuleta DO  Orthopedic Trauma Surgery  08/22/2022      Future Appointments   Date Time Provider Department Center   8/22/2022  1:15 PM  Tushar Archuleta, DO [facility]

## 2023-06-11 ENCOUNTER — HOSPITAL ENCOUNTER (EMERGENCY)
Facility: HOSPITAL | Age: 33
Discharge: HOME | End: 2023-06-11
Attending: EMERGENCY MEDICINE
Payer: COMMERCIAL

## 2023-06-11 VITALS
TEMPERATURE: 99 F | BODY MASS INDEX: 27.45 KG/M2 | DIASTOLIC BLOOD PRESSURE: 83 MMHG | WEIGHT: 181.13 LBS | OXYGEN SATURATION: 100 % | HEART RATE: 84 BPM | SYSTOLIC BLOOD PRESSURE: 136 MMHG | HEIGHT: 68 IN | RESPIRATION RATE: 17 BRPM

## 2023-06-11 DIAGNOSIS — J32.9 SINUSITIS, UNSPECIFIED CHRONICITY, UNSPECIFIED LOCATION: Primary | ICD-10-CM

## 2023-06-11 DIAGNOSIS — R50.9 FEVER, UNSPECIFIED FEVER CAUSE: ICD-10-CM

## 2023-06-11 LAB
ALBUMIN SERPL-MCNC: 3.9 G/DL (ref 3.5–5)
ALBUMIN/GLOB SERPL: 0.9 RATIO (ref 1.1–2)
ALP SERPL-CCNC: 113 UNIT/L (ref 40–150)
ALT SERPL-CCNC: 41 UNIT/L (ref 0–55)
APPEARANCE UR: CLEAR
AST SERPL-CCNC: 39 UNIT/L (ref 5–34)
BACTERIA #/AREA URNS AUTO: ABNORMAL /HPF
BASOPHILS # BLD AUTO: 0.02 X10(3)/MCL
BASOPHILS NFR BLD AUTO: 0.2 %
BILIRUB UR QL STRIP.AUTO: NEGATIVE MG/DL
BILIRUBIN DIRECT+TOT PNL SERPL-MCNC: 0.6 MG/DL
BUN SERPL-MCNC: 9.1 MG/DL (ref 8.9–20.6)
CALCIUM SERPL-MCNC: 9.5 MG/DL (ref 8.4–10.2)
CHLORIDE SERPL-SCNC: 104 MMOL/L (ref 98–107)
CO2 SERPL-SCNC: 26 MMOL/L (ref 22–29)
COLOR UR: ABNORMAL
CREAT SERPL-MCNC: 1.02 MG/DL (ref 0.73–1.18)
EOSINOPHIL # BLD AUTO: 0.26 X10(3)/MCL (ref 0–0.9)
EOSINOPHIL NFR BLD AUTO: 2.7 %
ERYTHROCYTE [DISTWIDTH] IN BLOOD BY AUTOMATED COUNT: 12.9 % (ref 11.5–17)
FLUAV AG UPPER RESP QL IA.RAPID: NOT DETECTED
FLUBV AG UPPER RESP QL IA.RAPID: NOT DETECTED
GFR SERPLBLD CREATININE-BSD FMLA CKD-EPI: >60 MLS/MIN/1.73/M2
GLOBULIN SER-MCNC: 4.2 GM/DL (ref 2.4–3.5)
GLUCOSE SERPL-MCNC: 87 MG/DL (ref 74–100)
GLUCOSE UR QL STRIP.AUTO: NORMAL MG/DL
HCT VFR BLD AUTO: 43.6 % (ref 42–52)
HGB BLD-MCNC: 13.7 G/DL (ref 14–18)
HYALINE CASTS #/AREA URNS LPF: ABNORMAL /LPF
IMM GRANULOCYTES # BLD AUTO: 0.02 X10(3)/MCL (ref 0–0.04)
IMM GRANULOCYTES NFR BLD AUTO: 0.2 %
KETONES UR QL STRIP.AUTO: NEGATIVE MG/DL
LEUKOCYTE ESTERASE UR QL STRIP.AUTO: NEGATIVE UNIT/L
LYMPHOCYTES # BLD AUTO: 1.32 X10(3)/MCL (ref 0.6–4.6)
LYMPHOCYTES NFR BLD AUTO: 13.5 %
MCH RBC QN AUTO: 26.3 PG (ref 27–31)
MCHC RBC AUTO-ENTMCNC: 31.4 G/DL (ref 33–36)
MCV RBC AUTO: 83.8 FL (ref 80–94)
MONOCYTES # BLD AUTO: 0.6 X10(3)/MCL (ref 0.1–1.3)
MONOCYTES NFR BLD AUTO: 6.1 %
MUCOUS THREADS URNS QL MICRO: ABNORMAL /LPF
NEUTROPHILS # BLD AUTO: 7.54 X10(3)/MCL (ref 2.1–9.2)
NEUTROPHILS NFR BLD AUTO: 77.3 %
NITRITE UR QL STRIP.AUTO: NEGATIVE
NRBC BLD AUTO-RTO: 0 %
PH UR STRIP.AUTO: 7 [PH]
PLATELET # BLD AUTO: 220 X10(3)/MCL (ref 130–400)
PMV BLD AUTO: 11 FL (ref 7.4–10.4)
POTASSIUM SERPL-SCNC: 4.1 MMOL/L (ref 3.5–5.1)
PROT SERPL-MCNC: 8.1 GM/DL (ref 6.4–8.3)
PROT UR QL STRIP.AUTO: NEGATIVE MG/DL
RBC # BLD AUTO: 5.2 X10(6)/MCL (ref 4.7–6.1)
RBC #/AREA URNS AUTO: ABNORMAL /HPF
RBC UR QL AUTO: NEGATIVE UNIT/L
SARS-COV-2 RNA RESP QL NAA+PROBE: NOT DETECTED
SODIUM SERPL-SCNC: 141 MMOL/L (ref 136–145)
SP GR UR STRIP.AUTO: 1.02
SQUAMOUS #/AREA URNS LPF: ABNORMAL /HPF
STREP A PCR (OHS): NOT DETECTED
UROBILINOGEN UR STRIP-ACNC: ABNORMAL MG/DL
WBC # SPEC AUTO: 9.76 X10(3)/MCL (ref 4.5–11.5)
WBC #/AREA URNS AUTO: ABNORMAL /HPF

## 2023-06-11 PROCEDURE — 0240U COVID/FLU A&B PCR: CPT | Performed by: NURSE PRACTITIONER

## 2023-06-11 PROCEDURE — 80053 COMPREHEN METABOLIC PANEL: CPT | Performed by: NURSE PRACTITIONER

## 2023-06-11 PROCEDURE — 81001 URINALYSIS AUTO W/SCOPE: CPT | Performed by: NURSE PRACTITIONER

## 2023-06-11 PROCEDURE — 99284 EMERGENCY DEPT VISIT MOD MDM: CPT

## 2023-06-11 PROCEDURE — 87651 STREP A DNA AMP PROBE: CPT | Performed by: NURSE PRACTITIONER

## 2023-06-11 PROCEDURE — 85025 COMPLETE CBC W/AUTO DIFF WBC: CPT | Performed by: NURSE PRACTITIONER

## 2023-06-11 PROCEDURE — 25000003 PHARM REV CODE 250: Performed by: NURSE PRACTITIONER

## 2023-06-11 RX ORDER — AMOXICILLIN AND CLAVULANATE POTASSIUM 875; 125 MG/1; MG/1
1 TABLET, FILM COATED ORAL 2 TIMES DAILY
Qty: 14 TABLET | Refills: 0 | Status: SHIPPED | OUTPATIENT
Start: 2023-06-11 | End: 2023-06-30 | Stop reason: ALTCHOICE

## 2023-06-11 RX ORDER — NALTREXONE 380 MG
380 KIT INTRAMUSCULAR
COMMUNITY

## 2023-06-11 RX ORDER — METHYLPREDNISOLONE 4 MG/1
TABLET ORAL
Qty: 21 EACH | Refills: 0 | Status: SHIPPED | OUTPATIENT
Start: 2023-06-11 | End: 2023-06-30 | Stop reason: ALTCHOICE

## 2023-06-11 RX ORDER — ACETAMINOPHEN 500 MG
1000 TABLET ORAL
Status: COMPLETED | OUTPATIENT
Start: 2023-06-11 | End: 2023-06-11

## 2023-06-11 RX ORDER — TRAZODONE HYDROCHLORIDE 50 MG/1
TABLET ORAL
COMMUNITY

## 2023-06-11 RX ADMIN — ACETAMINOPHEN 1000 MG: 500 TABLET, FILM COATED ORAL at 10:06
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
## 2023-06-12 NOTE — DISCHARGE INSTRUCTIONS
Take tylenol or motrin every 6-8 hours for fever of 101 or greater.  Follow up with your primary care physician in 3-5 days for follow up evaluation.  Take medication as prescribed.  Increase oral fluid hydration.  Return to the [facility] ED immediately for onset of chest pain, SOB, or fever.

## 2023-06-25 ENCOUNTER — HOSPITAL ENCOUNTER (EMERGENCY)
Facility: HOSPITAL | Age: 33
Discharge: HOME | End: 2023-06-25
Attending: INTERNAL MEDICINE
Payer: COMMERCIAL

## 2023-06-25 VITALS
HEIGHT: 68 IN | WEIGHT: 180.75 LBS | BODY MASS INDEX: 27.39 KG/M2 | DIASTOLIC BLOOD PRESSURE: 79 MMHG | SYSTOLIC BLOOD PRESSURE: 146 MMHG | HEART RATE: 88 BPM | OXYGEN SATURATION: 99 % | TEMPERATURE: 98 F | RESPIRATION RATE: 20 BRPM

## 2023-06-25 DIAGNOSIS — B37.2 CANDIDAL DERMATITIS: Primary | ICD-10-CM

## 2023-06-25 PROCEDURE — 99283 EMERGENCY DEPT VISIT LOW MDM: CPT

## 2023-06-25 RX ORDER — NYSTATIN 100000 U/G
CREAM TOPICAL 2 TIMES DAILY
Qty: 30 G | Refills: 1 | Status: SHIPPED | OUTPATIENT
Start: 2023-06-25 | End: 2023-07-09

## 2023-06-25 NOTE — DISCHARGE INSTRUCTIONS
Take all medications as prescribed.     Keep area as dry as possible, Use cornstarch/baby powder on top of the cream up to 3 times daily.    Return to ER for any changes or worsening of symptoms.

## 2023-06-25 NOTE — ED PROVIDER NOTES
Encounter Date: 6/25/2023       History     Chief Complaint   Patient presents with    Rash     C/o rash upper leg today     33 YO AAM in ER with complaints of rash to groin X earlier today. States it burns. Denies fever, chills, chest pain, SOB, abdominal pain, N/V/D, HA or dizziness. No other complaints.     The history is provided by the patient.   Review of patient's allergies indicates:  No Known Allergies  Past Medical History:   Diagnosis Date    Anxiety     Depression      History reviewed. No pertinent surgical history.  Family History   Problem Relation Age of Onset    No Known Problems Mother     No Known Problems Father      Social History     Tobacco Use    Smoking status: Never    Smokeless tobacco: Never   Substance Use Topics    Alcohol use: Never    Drug use: Never     Review of Systems   Constitutional:  Negative for chills and fever.   HENT:  Negative for congestion and trouble swallowing.    Respiratory:  Negative for shortness of breath and wheezing.    Cardiovascular:  Negative for chest pain and leg swelling.   Gastrointestinal:  Negative for abdominal pain, diarrhea, nausea and vomiting.   Musculoskeletal:  Negative for joint swelling.   Skin:  Positive for rash.   Neurological:  Negative for dizziness, weakness and headaches.     Physical Exam     Initial Vitals [06/25/23 1514]   BP Pulse Resp Temp SpO2   (!) 146/79 88 20 98.1 °F (36.7 °C) 99 %      MAP       --         Physical Exam    Nursing note and vitals reviewed.  Constitutional: He appears well-developed and well-nourished.   HENT:   Head: Normocephalic and atraumatic.   Nose: Nose normal.   Eyes: Conjunctivae are normal.   Neck: Neck supple.   Normal range of motion.  Cardiovascular:  Normal rate and regular rhythm.           Pulmonary/Chest: Breath sounds normal.   Musculoskeletal:         General: Normal range of motion.      Cervical back: Normal range of motion and neck supple.     Neurological: He is alert and oriented to  person, place, and time.   Skin: Skin is dry. Rash (yeast rash to groin on left and into gluteal cleft consistent with skin yeast/fungal infection) noted.   Psychiatric: He has a normal mood and affect.       ED Course   Procedures  Labs Reviewed - No data to display       Imaging Results    None          Medications - No data to display                           Clinical Impression:   Final diagnoses:  [B37.2] Candidal dermatitis (Primary)        ED Disposition Condition    Discharge Stable          ED Prescriptions       Medication Sig Dispense Start Date End Date Auth. Provider    nystatin (MYCOSTATIN) cream Apply topically 2 (two) times daily. for 14 days 30 g 6/25/2023 7/9/2023 JAILYN Clayton          Follow-up Information       Follow up With Specialties Details Why Contact Info    [facility] - Emergency Dept Emergency Medicine In 3 days As needed, If symptoms worsen [address] [facility] [address] [phone]    Primary Care Provider  Schedule an appointment as soon as possible for a visit in 5 days  Call a PCP to make an appointment for follow up within 3-5  days.             JAILYN Clayton  06/25/23 4602

## 2023-06-30 ENCOUNTER — OFFICE VISIT (OUTPATIENT)
Dept: URGENT CARE | Facility: CLINIC | Age: 33
End: 2023-06-30
Payer: MEDICAID

## 2023-06-30 VITALS
BODY MASS INDEX: 27.43 KG/M2 | WEIGHT: 181 LBS | TEMPERATURE: 98 F | DIASTOLIC BLOOD PRESSURE: 81 MMHG | RESPIRATION RATE: 18 BRPM | HEIGHT: 68 IN | HEART RATE: 97 BPM | SYSTOLIC BLOOD PRESSURE: 115 MMHG | OXYGEN SATURATION: 98 %

## 2023-06-30 DIAGNOSIS — Z76.89 REFERRAL OF PATIENT: ICD-10-CM

## 2023-06-30 DIAGNOSIS — R10.13 DYSPEPSIA: ICD-10-CM

## 2023-06-30 DIAGNOSIS — R11.10 VOMITING, UNSPECIFIED VOMITING TYPE, UNSPECIFIED WHETHER NAUSEA PRESENT: Primary | ICD-10-CM

## 2023-06-30 PROCEDURE — 99203 OFFICE O/P NEW LOW 30 MIN: CPT | Mod: S$PBB,,,

## 2023-06-30 PROCEDURE — 99215 OFFICE O/P EST HI 40 MIN: CPT | Mod: PBBFAC

## 2023-06-30 PROCEDURE — 99203 PR OFFICE/OUTPT VISIT, NEW, LEVL III, 30-44 MIN: ICD-10-PCS | Mod: S$PBB,,,

## 2023-06-30 RX ORDER — OMEPRAZOLE 40 MG/1
40 CAPSULE, DELAYED RELEASE ORAL DAILY
Qty: 30 CAPSULE | Refills: 0 | Status: SHIPPED | OUTPATIENT
Start: 2023-06-30 | End: 2023-07-30

## 2023-06-30 RX ORDER — ONDANSETRON 4 MG/1
4 TABLET, ORALLY DISINTEGRATING ORAL EVERY 6 HOURS PRN
Qty: 10 TABLET | Refills: 0 | Status: SHIPPED | OUTPATIENT
Start: 2023-06-30 | End: 2023-07-03

## 2023-06-30 NOTE — PATIENT INSTRUCTIONS
Rest. Avoid fried foods, spicy foods, carbonated beverages, or drinking too much fluids at one time. Take medications as ordered. Zofran as needed for nausea/vomiting. Clear liquids today, then slowly resume your usual diet starting with crackers, toast, soap when your appetite returns. Follow up with PCP, referral sent. Go to the nearest ER for severe abdominal pain, chest pain, or shortness of breath.

## 2023-06-30 NOTE — PROGRESS NOTES
"Subjective:      Patient ID: Brooke Harvey is a 32 y.o. male.    Vitals:  height is 5' 8" (1.727 m) and weight is 82.1 kg (181 lb). His temperature is 98.1 °F (36.7 °C). His blood pressure is 115/81 and his pulse is 97. His respiration is 18 and oxygen saturation is 98%.     Chief Complaint: Vomiting (Vomited once this AM. )    Cc as above. States he ate some spicy food last night and was drinking a lot of fluids this morning prior to vomiting. He has a history of a stomach ulcer. Unsure of the medication he was taking for it in the past. He needs a PCP.     Vomiting   This is a new problem. The current episode started today. The problem occurs less than 2 times per day. The problem has been resolved. The emesis has an appearance of stomach contents. There has been no fever. Pertinent negatives include no abdominal pain, coughing, diarrhea, fever or URI. Risk factors include suspect food intake. He has tried nothing for the symptoms.     Constitution: Negative for fever.   Respiratory:  Negative for cough.    Gastrointestinal:  Positive for vomiting. Negative for abdominal pain and diarrhea.    Objective:     Physical Exam   Constitutional: He is oriented to person, place, and time. He appears well-developed.  Non-toxic appearance. He does not appear ill. No distress. normal  HENT:   Head: Normocephalic and atraumatic.   Mouth/Throat: Mucous membranes are normal.   Eyes: Lids are normal.   Neck: Trachea normal. Neck supple.   Cardiovascular: Normal rate, regular rhythm, normal heart sounds and normal pulses.   Pulmonary/Chest: Effort normal and breath sounds normal. No respiratory distress.   Abdominal: Normal appearance and bowel sounds are normal. He exhibits no distension. Soft. There is no abdominal tenderness. There is no rebound and no guarding.   Musculoskeletal: Normal range of motion.         General: Normal range of motion.   Neurological: no focal deficit. He is alert and oriented to person, place, " and time. He has normal strength.   Skin: Skin is warm, dry, intact and not diaphoretic.   Psychiatric: His speech is normal and behavior is normal. Mood, judgment and thought content normal.   Nursing note and vitals reviewed.    Assessment:     1. Vomiting, unspecified vomiting type, unspecified whether nausea present    2. Dyspepsia    3. Referral of patient        Plan:       Vomiting, unspecified vomiting type, unspecified whether nausea present  -     ondansetron (ZOFRAN-ODT) 4 MG TbDL; Take 1 tablet (4 mg total) by mouth every 6 (six) hours as needed (nausea/vomiting).  Dispense: 10 tablet; Refill: 0    Dyspepsia  -     omeprazole (PRILOSEC) 40 MG capsule; Take 1 capsule (40 mg total) by mouth once daily.  Dispense: 30 capsule; Refill: 0    Referral of patient  -     Ambulatory referral/consult to Internal Medicine     Rest. Avoid fried foods, spicy foods, carbonated beverages, or drinking too much fluids at one time. Take medications as ordered. Zofran as needed for nausea/vomiting. Clear liquids today, then slowly resume your usual diet starting with crackers, toast, soap when your appetite returns. Follow up with PCP, referral sent.

## 2023-08-16 LAB
INR PPP: 1
PROTHROMBIN TIME: 13.4 SECONDS (ref 11.4–14)

## 2023-09-28 ENCOUNTER — HOSPITAL ENCOUNTER (EMERGENCY)
Facility: HOSPITAL | Age: 33
Discharge: HOME | End: 2023-09-28
Attending: EMERGENCY MEDICINE
Payer: COMMERCIAL

## 2023-09-28 VITALS
SYSTOLIC BLOOD PRESSURE: 145 MMHG | DIASTOLIC BLOOD PRESSURE: 102 MMHG | RESPIRATION RATE: 20 BRPM | WEIGHT: 174.63 LBS | OXYGEN SATURATION: 97 % | HEART RATE: 79 BPM | TEMPERATURE: 97 F | HEIGHT: 70 IN | BODY MASS INDEX: 25 KG/M2

## 2023-09-28 DIAGNOSIS — Z75.8 DOES NOT HAVE PRIMARY CARE PROVIDER: ICD-10-CM

## 2023-09-28 DIAGNOSIS — M54.50 ACUTE BILATERAL LOW BACK PAIN WITHOUT SCIATICA: Primary | ICD-10-CM

## 2023-09-28 PROCEDURE — 96372 THER/PROPH/DIAG INJ SC/IM: CPT | Performed by: EMERGENCY MEDICINE

## 2023-09-28 PROCEDURE — 99283 EMERGENCY DEPT VISIT LOW MDM: CPT

## 2023-09-28 PROCEDURE — 63600175 PHARM REV CODE 636 W HCPCS: Performed by: EMERGENCY MEDICINE

## 2023-09-28 RX ORDER — KETOROLAC TROMETHAMINE 30 MG/ML
15 INJECTION, SOLUTION INTRAMUSCULAR; INTRAVENOUS
Status: COMPLETED | OUTPATIENT
Start: 2023-09-28 | End: 2023-09-28

## 2023-09-28 RX ORDER — DICLOFENAC SODIUM 50 MG/1
50 TABLET, DELAYED RELEASE ORAL 2 TIMES DAILY PRN
Qty: 30 TABLET | Refills: 0 | Status: SHIPPED | OUTPATIENT
Start: 2023-09-28

## 2023-09-28 RX ADMIN — KETOROLAC TROMETHAMINE 15 MG: 30 INJECTION, SOLUTION INTRAMUSCULAR; INTRAVENOUS at 10:09

## 2023-09-29 NOTE — ED PROVIDER NOTES
"ED PROVIDER NOTE  9/28/2023    CHIEF COMPLAINT:   Chief Complaint   Patient presents with    Back Pain    Foot Pain     Jamie. Feet pain       HISTORY OF PRESENT ILLNESS:   Brooke Harvey is a 32 y.o. male who presents with chief complaint Back pain.  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  States the pain stays in his back in his not radiate down his legs.  Denies any trauma or injury.  Denies numbness or weakness in extremities, bowel or bladder incontinence, saddle anesthesia, fevers, hematuria, dysuria.    The history is provided by the patient.         REVIEW OF SYSTEMS: as noted in the HPI.  NURSING NOTES REVIEWED      PAST MEDICAL/SURGICAL HISTORY:   Past Medical History:   Diagnosis Date    Anxiety     Depression     No past surgical history on file.    FAMILY HISTORY:   Family History   Problem Relation Age of Onset    No Known Problems Mother     No Known Problems Father        SOCIAL HISTORY:   Social History     Tobacco Use    Smoking status: Never    Smokeless tobacco: Never   Substance Use Topics    Alcohol use: Never       ALLERGIES: Review of patient's allergies indicates:  No Known Allergies    PHYSICAL EXAM:  Initial Vitals [09/28/23 2224]   BP Pulse Resp Temp SpO2   (!) 132/91 100 20 96.8 °F (36 °C) 100 %      MAP       --         Physical Exam    Nursing note and vitals reviewed.  Constitutional: He appears well-developed and well-nourished. No distress.   HENT:   Head: Normocephalic and atraumatic.   Nose: Nose normal.   Mouth/Throat: Oropharynx is clear and moist and mucous membranes are normal.   Eyes: Conjunctivae and EOM are normal. Pupils are equal, round, and reactive to light.   Neck: Neck supple. No tracheal deviation present.   Cardiovascular:  Normal rate, regular rhythm, normal heart sounds, intact " distal pulses and normal pulses.           Pulmonary/Chest: Effort normal and breath sounds normal. No respiratory distress.   Abdominal: Abdomen is soft. There is no abdominal tenderness. There is no rebound and no guarding.   Musculoskeletal:         General: Normal range of motion.      Cervical back: Neck supple. No bony tenderness.      Thoracic back: No bony tenderness.      Lumbar back: Tenderness present. No bony tenderness.        Back:      Neurological: He is alert and oriented to person, place, and time. GCS eye subscore is 4. GCS verbal subscore is 5. GCS motor subscore is 6.   CN II-XII intact. Moves all extremities. No gross sensory or motor deficits.   Skin: Skin is warm, dry and intact.   Psychiatric: He has a normal mood and affect. His speech is normal and behavior is normal. Judgment and thought content normal. Cognition and memory are normal.         RESULTS:  Labs Reviewed - No data to display  Imaging Results    None         PROCEDURES:  Procedures    ECG:       ED COURSE AND MEDICAL DECISION MAKING:  Medications   ketorolac injection 15 mg (has no administration in time range)           Medical Decision Making  I estimate there is LOW risk for ABDOMINAL AORTIC ANEURYSM, CAUDA EQUINA SYNDROME, EPIDURAL MASS LESION, SPINAL STENOSIS, OR HERNIATED DISK CAUSING SEVERE STENOSIS, thus I consider the discharge disposition reasonable. We have discussed the diagnosis and risks, and we agree with discharging home to follow-up with their primary doctor. We also discussed returning to the Emergency Department immediately if new or worsening symptoms occur. We have discussed the symptoms which are most concerning (e.g., saddle anesthesia, urinary or bowel incontinence or retention, changing or worsening pain) that necessitate immediate return.  We will treat symptomatically with p.r.n. diclofenac and provide referral for Internal Medicine as he states he no longer has a PCP.  Given strict ED return  precautions. I have spoken with the patient and/or caregivers. I have explained the patient's condition, diagnoses and treatment plan based on the information available to me at this time. I have answered the patient's and/or caregiver's questions and addressed any concerns. The patient and/or caregivers have as good an understanding of the patient's diagnosis, condition and treatment plan as can be expected at this point. The vital signs have been stable. The patient's condition is stable and appropriate for discharge from the emergency department.     The patient will pursue further outpatient evaluation with the primary care physician or other designated or consulting physician as outlined in the discharge instructions. The patient and/or caregivers are agreeable to this plan of care and follow-up instructions have been explained in detail. The patient and/or caregivers have received these instructions in written format and have expressed an understanding of the discharge instructions. The patient and/or caregivers are aware that any significant change in condition or worsening of symptoms should prompt an immediate return to this or the closest emergency department or a call to 911.    Risk  OTC drugs.  Prescription drug management.        CLINICAL IMPRESSION:  1. Acute bilateral low back pain without sciatica    2. Does not have primary care provider        DISPOSITION:              Mina Gibson DO  09/28/23 6745

## 2023-10-03 ENCOUNTER — HOSPITAL ENCOUNTER (EMERGENCY)
Facility: HOSPITAL | Age: 33
Discharge: PSYCHIATRIC OR SUBSTANCE USE FACILITY | End: 2023-10-04
Attending: FAMILY MEDICINE
Payer: COMMERCIAL

## 2023-10-03 DIAGNOSIS — R45.851 DEPRESSION WITH SUICIDAL IDEATION: ICD-10-CM

## 2023-10-03 DIAGNOSIS — F29 PSYCHOSIS, UNSPECIFIED PSYCHOSIS TYPE: Primary | ICD-10-CM

## 2023-10-03 DIAGNOSIS — F32.A DEPRESSION WITH SUICIDAL IDEATION: ICD-10-CM

## 2023-10-03 LAB
ALBUMIN SERPL-MCNC: 4.2 G/DL (ref 3.5–5)
ALBUMIN/GLOB SERPL: 1.1 RATIO (ref 1.1–2)
ALP SERPL-CCNC: 95 UNIT/L (ref 40–150)
ALT SERPL-CCNC: 28 UNIT/L (ref 0–55)
AST SERPL-CCNC: 35 UNIT/L (ref 5–34)
BASOPHILS # BLD AUTO: 0.02 X10(3)/MCL
BASOPHILS NFR BLD AUTO: 0.3 %
BILIRUB SERPL-MCNC: 0.6 MG/DL
BUN SERPL-MCNC: 11.2 MG/DL (ref 8.9–20.6)
CALCIUM SERPL-MCNC: 10 MG/DL (ref 8.4–10.2)
CHLORIDE SERPL-SCNC: 105 MMOL/L (ref 98–107)
CO2 SERPL-SCNC: 26 MMOL/L (ref 22–29)
CREAT SERPL-MCNC: 0.88 MG/DL (ref 0.73–1.18)
EOSINOPHIL # BLD AUTO: 0.22 X10(3)/MCL (ref 0–0.9)
EOSINOPHIL NFR BLD AUTO: 3.4 %
ERYTHROCYTE [DISTWIDTH] IN BLOOD BY AUTOMATED COUNT: 12.8 % (ref 11.5–17)
ETHANOL SERPL-MCNC: <10 MG/DL
GFR SERPLBLD CREATININE-BSD FMLA CKD-EPI: >60 MLS/MIN/1.73/M2
GLOBULIN SER-MCNC: 3.9 GM/DL (ref 2.4–3.5)
GLUCOSE SERPL-MCNC: 91 MG/DL (ref 74–100)
HCT VFR BLD AUTO: 41.8 % (ref 42–52)
HGB BLD-MCNC: 13 G/DL (ref 14–18)
IMM GRANULOCYTES # BLD AUTO: 0.01 X10(3)/MCL (ref 0–0.04)
IMM GRANULOCYTES NFR BLD AUTO: 0.2 %
LYMPHOCYTES # BLD AUTO: 2.63 X10(3)/MCL (ref 0.6–4.6)
LYMPHOCYTES NFR BLD AUTO: 40.7 %
MCH RBC QN AUTO: 26.3 PG (ref 27–31)
MCHC RBC AUTO-ENTMCNC: 31.1 G/DL (ref 33–36)
MCV RBC AUTO: 84.4 FL (ref 80–94)
MONOCYTES # BLD AUTO: 0.45 X10(3)/MCL (ref 0.1–1.3)
MONOCYTES NFR BLD AUTO: 7 %
NEUTROPHILS # BLD AUTO: 3.13 X10(3)/MCL (ref 2.1–9.2)
NEUTROPHILS NFR BLD AUTO: 48.4 %
NRBC BLD AUTO-RTO: 0 %
PLATELET # BLD AUTO: 232 X10(3)/MCL (ref 130–400)
PMV BLD AUTO: 10.8 FL (ref 7.4–10.4)
POTASSIUM SERPL-SCNC: 3.5 MMOL/L (ref 3.5–5.1)
PROT SERPL-MCNC: 8.1 GM/DL (ref 6.4–8.3)
RBC # BLD AUTO: 4.95 X10(6)/MCL (ref 4.7–6.1)
SODIUM SERPL-SCNC: 143 MMOL/L (ref 136–145)
TSH SERPL-ACNC: 1.26 UIU/ML (ref 0.35–4.94)
WBC # SPEC AUTO: 6.46 X10(3)/MCL (ref 4.5–11.5)

## 2023-10-03 PROCEDURE — 81001 URINALYSIS AUTO W/SCOPE: CPT | Performed by: FAMILY MEDICINE

## 2023-10-03 PROCEDURE — 80053 COMPREHEN METABOLIC PANEL: CPT | Performed by: FAMILY MEDICINE

## 2023-10-03 PROCEDURE — 80307 DRUG TEST PRSMV CHEM ANLYZR: CPT | Performed by: FAMILY MEDICINE

## 2023-10-03 PROCEDURE — 87635 SARS-COV-2 COVID-19 AMP PRB: CPT | Performed by: FAMILY MEDICINE

## 2023-10-03 PROCEDURE — 99285 EMERGENCY DEPT VISIT HI MDM: CPT

## 2023-10-03 PROCEDURE — 82077 ASSAY SPEC XCP UR&BREATH IA: CPT | Performed by: FAMILY MEDICINE

## 2023-10-03 PROCEDURE — 85025 COMPLETE CBC W/AUTO DIFF WBC: CPT | Performed by: FAMILY MEDICINE

## 2023-10-03 PROCEDURE — 84443 ASSAY THYROID STIM HORMONE: CPT | Performed by: FAMILY MEDICINE

## 2023-10-04 VITALS
WEIGHT: 175.5 LBS | HEART RATE: 86 BPM | BODY MASS INDEX: 26.6 KG/M2 | DIASTOLIC BLOOD PRESSURE: 82 MMHG | SYSTOLIC BLOOD PRESSURE: 148 MMHG | OXYGEN SATURATION: 100 % | HEIGHT: 68 IN | RESPIRATION RATE: 18 BRPM | TEMPERATURE: 98 F

## 2023-10-04 LAB
AMPHET UR QL SCN: NEGATIVE
APPEARANCE UR: CLEAR
BACTERIA #/AREA URNS AUTO: ABNORMAL /HPF
BARBITURATE SCN PRESENT UR: NEGATIVE
BENZODIAZ UR QL SCN: NEGATIVE
BILIRUB UR QL STRIP.AUTO: NEGATIVE
CANNABINOIDS UR QL SCN: NEGATIVE
COCAINE UR QL SCN: NEGATIVE
COLOR UR AUTO: YELLOW
FENTANYL UR QL SCN: NEGATIVE
GLUCOSE UR QL STRIP.AUTO: NORMAL
HYALINE CASTS #/AREA URNS LPF: ABNORMAL /LPF
KETONES UR QL STRIP.AUTO: NEGATIVE
LEUKOCYTE ESTERASE UR QL STRIP.AUTO: NEGATIVE
MDMA UR QL SCN: NEGATIVE
MUCOUS THREADS URNS QL MICRO: ABNORMAL /LPF
NITRITE UR QL STRIP.AUTO: NEGATIVE
OPIATES UR QL SCN: NEGATIVE
PCP UR QL: NEGATIVE
PH UR STRIP.AUTO: 5.5 [PH]
PH UR: 5.5 [PH] (ref 3–11)
PROT UR QL STRIP.AUTO: ABNORMAL
RBC #/AREA URNS AUTO: ABNORMAL /HPF
RBC UR QL AUTO: NEGATIVE
SARS-COV-2 RDRP RESP QL NAA+PROBE: NEGATIVE
SP GR UR STRIP.AUTO: 1.03 (ref 1–1.03)
SPECIFIC GRAVITY, URINE AUTO (.000) (OHS): 1.03 (ref 1–1.03)
SQUAMOUS #/AREA URNS LPF: ABNORMAL /HPF
UROBILINOGEN UR STRIP-ACNC: ABNORMAL
WBC #/AREA URNS AUTO: ABNORMAL /HPF

## 2023-10-04 NOTE — ED PROVIDER NOTES
"Encounter Date: 10/3/2023       History     Chief Complaint   Patient presents with    Depression    Paranoid     States was speaking at [employer] and got "my words twisted".  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  States is anxious "about a lot right now".  Denies SI "but I don't want it to get that far".       Patient 32-year-old gentleman presents emergency room with complaints of depression and concerns that he will become suicidal if he goes home and is unsure what he might do.  Currently calm cooperative.  Patient reports that he was depressed because he was not gotten over the loss of his uncle and got father.  Patient also reports that he was running in the upcoming election, and there is a lot of stress associated with that as well.  Currently calm and cooperative.    The history is provided by the patient.     Review of patient's allergies indicates:  No Known Allergies  Past Medical History:   Diagnosis Date    Anxiety     Depression      History reviewed. No pertinent surgical history.  Family History   Problem Relation Age of Onset    No Known Problems Mother     No Known Problems Father      Social History     Tobacco Use    Smoking status: Never    Smokeless tobacco: Never   Substance Use Topics    Alcohol use: Yes    Drug use: Not Currently     Review of Systems   Constitutional:  Negative for chills, fatigue and fever.   HENT:  Negative for ear pain, rhinorrhea and sore throat.    Eyes:  Negative for photophobia and pain.   Respiratory:  Negative for cough, shortness of breath and wheezing.    Cardiovascular:  Negative for chest pain.   Gastrointestinal:  Negative for abdominal pain, diarrhea, nausea and vomiting.   Genitourinary:  Negative for dysuria.   Neurological:  Negative for dizziness, weakness and headaches.   All other systems reviewed and are negative.      Physical Exam     Initial Vitals [10/03/23 2212]   BP Pulse Resp Temp SpO2   (!) 152/96 86 17 98.2 °F (36.8 °C) 100 %      MAP   "     --         Physical Exam    Nursing note and vitals reviewed.  Constitutional: He appears well-developed and well-nourished.   HENT:   Head: Normocephalic and atraumatic.   Eyes: EOM are normal. Pupils are equal, round, and reactive to light.   Neck: Neck supple.   Normal range of motion.  Cardiovascular:  Normal rate, regular rhythm and normal heart sounds.     Exam reveals no gallop and no friction rub.       No murmur heard.  Pulmonary/Chest: Breath sounds normal. No respiratory distress. He has no rhonchi. He has no rales.   Abdominal: Abdomen is soft. Bowel sounds are normal. He exhibits no distension. There is no abdominal tenderness.   Musculoskeletal:         General: Normal range of motion.      Cervical back: Normal range of motion and neck supple.     Neurological: He is alert and oriented to person, place, and time. He has normal strength.   Skin: Skin is warm and dry.   Psychiatric: His behavior is normal. Judgment normal. Thought content is delusional. He exhibits a depressed mood. He expresses suicidal ideation. He expresses no suicidal plans.         ED Course   Procedures  Labs Reviewed   COMPREHENSIVE METABOLIC PANEL - Abnormal; Notable for the following components:       Result Value    Globulin 3.9 (*)     Aspartate Aminotransferase 35 (*)     All other components within normal limits   URINALYSIS, REFLEX TO URINE CULTURE - Abnormal; Notable for the following components:    Specific Gravity, UA 1.034 (*)     Protein, UA Trace (*)     Urobilinogen, UA 1+ (*)     Mucous, UA Trace (*)     All other components within normal limits   CBC WITH DIFFERENTIAL - Abnormal; Notable for the following components:    Hgb 13.0 (*)     Hct 41.8 (*)     MCH 26.3 (*)     MCHC 31.1 (*)     MPV 10.8 (*)     All other components within normal limits   TSH - Normal   DRUG SCREEN, URINE (BEAKER) - Normal    Narrative:     Cut off concentrations:    Amphetamines - 1000 ng/ml  Barbiturates - 200 ng/ml  Benzodiazepine  - 200 ng/ml  Cannabinoids (THC) - 50 ng/ml  Cocaine - 300 ng/ml  Fentanyl - 1.0 ng/ml  MDMA - 500 ng/ml  Opiates - 300 ng/ml   Phencyclidine (PCP) - 25 ng/ml    Specimen submitted for drug analysis and tested for pH and specific gravity in order to evaluate sample integrity. Suspect tampering if specific gravity is <1.003 and/or pH is not within the range of 4.5 - 8.0  False negatives may result form substances such as bleach added to urine.  False positives may result for the presence of a substance with similar chemical structure to the drug or its metabolite.    This test provides only a PRELIMINARY analytical test result. A more specific alternate chemical method must be used in order to obtain a confirmed analytical result. Gas chromatography/mass spectrometry (GC/MS) is the preferred confirmatory method. Other chemical confirmation methods are available. Clinical consideration and professional judgement should be applied to any drug of abuse test result, particularly when preliminary positive results are used.    Positive results will be confirmed only at the physicians request. Unconfirmed screening results are to be used only for medical purposes (treatment).        ALCOHOL,MEDICAL (ETHANOL) - Normal   CBC W/ AUTO DIFFERENTIAL    Narrative:     The following orders were created for panel order CBC auto differential.  Procedure                               Abnormality         Status                     ---------                               -----------         ------                     CBC with Differential[[id number]]       Abnormal            Final result                 Please view results for these tests on the individual orders.   SARS-COV-2 RNA AMPLIFICATION, QUAL   EXTRA TUBES    Narrative:     The following orders were created for panel order EXTRA TUBES.  Procedure                               Abnormality         Status                     ---------                               -----------          ------                     Red Top Hold[[id number]]                                                                 Please view results for these tests on the individual orders.   RED TOP HOLD          Imaging Results    None          Medications - No data to display  Medical Decision Making  Patient is a 32-year-old gentleman presents emergency room with delusions of running for office, with depression and suicidal ideations.  Currently calm cooperative.  Due to suicidal ideations with depression as well as delusions will place under physician emergency certificate for inpatient psychiatric evaluation treatment.  Will obtain clearance laboratory evaluation.  Will continue to monitor    Differential diagnosis:  Acute psychosis, depression, suicidal ideations    Amount and/or Complexity of Data Reviewed  Labs: ordered.               ED Course as of 10/04/23 0036   Wed Oct 04, 2023   0035 Labs unremarkable.  Medically cleared for psychiatric placement. [MW]      ED Course User Index  [MW] Titus Stoddard MD       Medically cleared for psychiatry placement: 10/4/2023 12:36 AM            Clinical Impression:   Final diagnoses:  [F29] Psychosis, unspecified psychosis type (Primary)  [F32.A, R45.851] Depression with suicidal ideation        ED Disposition Condition    Transfer to Psych Facility Stable          ED Prescriptions    None       Follow-up Information    None          Titus Stoddard MD  10/04/23 0036

## 2024-07-20 ENCOUNTER — HOSPITAL ENCOUNTER (EMERGENCY)
Facility: HOSPITAL | Age: 34
Discharge: HOME | End: 2024-07-20
Attending: STUDENT IN AN ORGANIZED HEALTH CARE EDUCATION/TRAINING PROGRAM
Payer: MEDICAID

## 2024-07-20 VITALS
WEIGHT: 190.06 LBS | OXYGEN SATURATION: 98 % | HEIGHT: 68 IN | BODY MASS INDEX: 28.8 KG/M2 | RESPIRATION RATE: 16 BRPM | DIASTOLIC BLOOD PRESSURE: 89 MMHG | SYSTOLIC BLOOD PRESSURE: 141 MMHG | HEART RATE: 73 BPM | TEMPERATURE: 98 F

## 2024-07-20 DIAGNOSIS — M25.561 PAIN AND SWELLING OF KNEE, RIGHT: ICD-10-CM

## 2024-07-20 DIAGNOSIS — M25.562 ACUTE PAIN OF BOTH KNEES: Primary | ICD-10-CM

## 2024-07-20 DIAGNOSIS — M25.461 PAIN AND SWELLING OF KNEE, RIGHT: ICD-10-CM

## 2024-07-20 DIAGNOSIS — M25.561 ACUTE PAIN OF BOTH KNEES: Primary | ICD-10-CM

## 2024-07-20 PROCEDURE — 99283 EMERGENCY DEPT VISIT LOW MDM: CPT | Mod: 25

## 2024-07-20 RX ORDER — DICLOFENAC SODIUM 75 MG/1
75 TABLET, DELAYED RELEASE ORAL 2 TIMES DAILY PRN
Qty: 20 TABLET | Refills: 0 | Status: SHIPPED | OUTPATIENT
Start: 2024-07-20

## 2024-07-20 NOTE — ED PROVIDER NOTES
Encounter Date: 7/20/2024       History     Chief Complaint   Patient presents with    Knee Pain     Pt arrives with c/o bilateral knee pain and swelling x1 day; denies any injury      Patient presents to emergency department complaining of pain and swelling in his bilateral knees.  He states he has had these symptoms off and on for the last year.  He has never had x-rays before or had it worked up before.  He denies any fevers.  No new injuries.    The history is provided by the patient.     Review of patient's allergies indicates:  No Known Allergies  Past Medical History:   Diagnosis Date    Anxiety     Depression      History reviewed. No pertinent surgical history.  Family History   Problem Relation Name Age of Onset    No Known Problems Mother      No Known Problems Father       Social History     Tobacco Use    Smoking status: Never    Smokeless tobacco: Never   Substance Use Topics    Alcohol use: Yes    Drug use: Not Currently     Review of Systems   Constitutional:  Negative for chills and fever.   HENT:  Negative for congestion and sore throat.    Respiratory:  Negative for cough and shortness of breath.    Cardiovascular:  Negative for chest pain and palpitations.   Gastrointestinal:  Negative for abdominal pain and nausea.   Genitourinary:  Negative for dysuria and hematuria.   Musculoskeletal:  Negative for arthralgias and myalgias.   Neurological:  Negative for dizziness and weakness.       Physical Exam     Initial Vitals [07/20/24 0039]   BP Pulse Resp Temp SpO2   138/88 75 16 98 °F (36.7 °C) 98 %      MAP       --         Physical Exam    Nursing note and vitals reviewed.  Constitutional: He appears well-developed and well-nourished.   HENT:   Head: Normocephalic and atraumatic.   Eyes: Conjunctivae are normal. Pupils are equal, round, and reactive to light.   Neck: Neck supple.   Normal range of motion.  Cardiovascular:  Normal rate and regular rhythm.           Pulmonary/Chest: Breath sounds  normal. No respiratory distress.   Abdominal: Abdomen is soft. There is no abdominal tenderness.   Musculoskeletal:         General: Tenderness present. No edema. Normal range of motion.      Cervical back: Normal range of motion and neck supple.      Comments: Mild tenderness to bilateral knees, no overlying warmth or erythema, good range of motion.     Neurological: He is alert and oriented to person, place, and time.   Skin: Skin is warm and dry.         ED Course   Procedures  Labs Reviewed - No data to display       Imaging Results              X-Ray Knee 3 View Bilateral (Preliminary result)  Result time 07/20/24 01:46:58      Wet Read by Crispin Maynard MD (07/20/24 01:46:58, [facility], Emergency Medicine)    No acute bony process                                     Medications - No data to display  Medical Decision Making  X-rays on my review showed no acute bony injuries.  Will place on NSAIDs.    Amount and/or Complexity of Data Reviewed  Radiology: ordered and independent interpretation performed. Decision-making details documented in ED Course.    Risk  Prescription drug management.      Additional MDM:   Differential Diagnosis:   Fracture, septic joint, cellulitis, abscess, gout, RA, OA among others                                     Clinical Impression:  Final diagnoses:  [M25.561, M25.461] Pain and swelling of knee, right  [M25.561, M25.562] Acute pain of both knees (Primary)          ED Disposition Condition    Discharge Stable          ED Prescriptions       Medication Sig Dispense Start Date End Date Auth. Provider    diclofenac (VOLTAREN) 75 MG EC tablet Take 1 tablet (75 mg total) by mouth 2 (two) times daily as needed (Pain). 20 tablet 7/20/2024 -- Crispin Maynard MD          Follow-up Information       Follow up With Specialties Details Why Contact Info    [facility] Emergency Medicine Go to  If symptoms worsen [address] [facility]  [place] [address]  [phone]             Crispin Maynard MD  07/20/24 0231

## 2025-03-07 ENCOUNTER — OFFICE VISIT (OUTPATIENT)
Dept: URGENT CARE | Facility: CLINIC | Age: 35
End: 2025-03-07
Payer: MEDICAID

## 2025-03-07 VITALS
DIASTOLIC BLOOD PRESSURE: 87 MMHG | WEIGHT: 200.63 LBS | HEIGHT: 68 IN | TEMPERATURE: 98 F | RESPIRATION RATE: 18 BRPM | OXYGEN SATURATION: 100 % | SYSTOLIC BLOOD PRESSURE: 121 MMHG | BODY MASS INDEX: 30.41 KG/M2 | HEART RATE: 83 BPM

## 2025-03-07 DIAGNOSIS — F41.9 ANXIETY: ICD-10-CM

## 2025-03-07 DIAGNOSIS — G47.00 INSOMNIA, UNSPECIFIED TYPE: ICD-10-CM

## 2025-03-07 DIAGNOSIS — Z76.0 PRESCRIPTION REFILL: Primary | ICD-10-CM

## 2025-03-07 PROCEDURE — 99215 OFFICE O/P EST HI 40 MIN: CPT | Mod: PBBFAC | Performed by: NURSE PRACTITIONER

## 2025-03-07 RX ORDER — TRAZODONE HYDROCHLORIDE 50 MG/1
50 TABLET ORAL NIGHTLY
Qty: 30 TABLET | Refills: 0 | Status: SHIPPED | OUTPATIENT
Start: 2025-03-07 | End: 2025-04-06

## 2025-03-07 NOTE — PROGRESS NOTES
"Subjective:      Patient ID: Brooke Harvey is a 34 y.o. male.    Vitals:  height is 5' 8" (1.727 m) and weight is 91 kg (200 lb 9.6 oz). His temperature is 97.7 °F (36.5 °C). His blood pressure is 121/87 and his pulse is 83. His respiration is 18 and oxygen saturation is 100%.     Chief Complaint: Anxiety (Patient states that he is having anxiety and haven't slept since Monday)    34-year-old  male presents to urgent care requesting refill on medication for anxiety and insomnia.  Patient states he has been back and [place] for the last 5 days and he is out of his medication (out of trazodone 50 mg times 5 days).  Patient requesting refill on trazodone for sleep.    Anxiety          Constitution: Negative.   HENT: Negative.     Neck: neck negative.   Cardiovascular: Negative.    Eyes: Negative.    Respiratory: Negative.     Gastrointestinal: Negative.    Endocrine: negative.   Genitourinary: Negative.    Musculoskeletal: Negative.    Skin: Negative.    Allergic/Immunologic: Negative.    Neurological: Negative.    Hematologic/Lymphatic: Negative.    Psychiatric/Behavioral:  Positive for sleep disturbance.         Anxiety      Objective:     Physical Exam   Constitutional: He is oriented to person, place, and time. He appears well-developed. He is cooperative. He does not appear ill.   HENT:   Head: Normocephalic and atraumatic.   Ears:   Right Ear: Hearing, tympanic membrane, external ear and ear canal normal.   Left Ear: Hearing, tympanic membrane, external ear and ear canal normal.   Nose: Nose normal. No mucosal edema, rhinorrhea, nasal deformity or congestion. No epistaxis. Right sinus exhibits no maxillary sinus tenderness and no frontal sinus tenderness. Left sinus exhibits no maxillary sinus tenderness and no frontal sinus tenderness.   Mouth/Throat: Uvula is midline, oropharynx is clear and moist and mucous membranes are normal. Mucous membranes are moist. No trismus in the jaw. Normal " "dentition. No uvula swelling. No posterior oropharyngeal erythema.   Eyes: Conjunctivae and lids are normal.   Neck: Trachea normal and phonation normal. Neck supple.   Cardiovascular: Normal rate, regular rhythm, normal heart sounds and normal pulses.   Pulmonary/Chest: Effort normal and breath sounds normal.   Abdominal: Normal appearance and bowel sounds are normal. Soft.   Musculoskeletal: Normal range of motion.         General: Normal range of motion.   Lymphadenopathy:     He has no cervical adenopathy.   Neurological: He is alert and oriented to person, place, and time. He exhibits normal muscle tone.   Skin: Skin is warm, dry and intact. Capillary refill takes less than 2 seconds.   Psychiatric: His speech is normal and behavior is normal. Judgment and thought content normal.   Nursing note and vitals reviewed.       Previous History      Review of patient's allergies indicates:  No Known Allergies    Past Medical History:   Diagnosis Date    Anxiety     Depression      Current Outpatient Medications   Medication Instructions    diclofenac (VOLTAREN) 50 mg, Oral, 2 times daily PRN    diclofenac (VOLTAREN) 75 mg, Oral, 2 times daily PRN    FLUoxetine 20 mg, Daily    hydrOXYzine (ATARAX) 50 mg, Nightly    nystatin (MYCOSTATIN) cream Topical (Top), 2 times daily    omeprazole (PRILOSEC) 40 mg, Oral, Daily    traZODone (DESYREL) 50 MG tablet trazodone 50 mg tablet    VivitroL 380 mg     History reviewed. No pertinent surgical history.  Family History   Problem Relation Name Age of Onset    No Known Problems Mother      No Known Problems Father         Social History[1]     Physical Exam      Vital Signs Reviewed   /87 (Patient Position: Sitting)   Pulse 83   Temp 97.7 °F (36.5 °C)   Resp 18   Ht 5' 8" (1.727 m)   Wt 91 kg (200 lb 9.6 oz)   SpO2 100%   BMI 30.50 kg/m²        Procedures    Procedures     Labs     Results for orders placed or performed in visit on 10/05/23   Lipid Panel    " Collection Time: 10/05/23  3:00 AM   Result Value Ref Range    Cholesterol Total 190 <=200 mg/dL    HDL Cholesterol 28 (L) 35 - 60 mg/dL    Triglyceride 88 34 - 140 mg/dL    Cholesterol/HDL Ratio 7 (H) 0 - 5    Very Low Density Lipoprotein 18     LDL Cholesterol 144.00 (H) 50.00 - 140.00 mg/dL       Assessment:     1. Prescription refill    2. Anxiety    3. Insomnia, unspecified type        Plan:       Prescription refill    Anxiety    Insomnia, unspecified type                             [1]  Social History  Tobacco Use    Smoking status: Never    Smokeless tobacco: Never   Substance Use Topics    Alcohol use: Yes    Drug use: Not Currently

## 2025-03-07 NOTE — PATIENT INSTRUCTIONS
Instructions:          Anxiety is a condition that causes you to feel extremely worried or nervous. The feelings are so strong that they can cause problems with your daily activities or sleep. Anxiety may be triggered by something you fear, or it may happen without a cause. Family or work stress, smoking, caffeine, and alcohol can increase your risk for anxiety. Certain medicines or health conditions can also increase your risk. Anxiety can become a long-term condition if it is not managed or treated.  DISCHARGE INSTRUCTIONS:  Call your local emergency number (911 in the ) if:  You have chest pain, tightness, or heaviness that may spread to your shoulders, arms, jaw, neck, or back.  You think about harming yourself or someone else.  Call your doctor if:  Your symptoms get worse or do not get better with treatment.  Your anxiety keeps you from doing your regular daily activities.  You have new symptoms since your last visit.  You have questions or concerns about your condition or care.  The following resources are available at any time to help you, if needed:  Contact a suicide prevention organization:  For the [employer] Suicide and Crisis Lifeline:  Call or text [employer]  Send a chat on https://[employer]/chat  Call 9-940-774-8879 (1-800-273-TALK)  For the Suicide Hotline, call 1-696.482.8646 (0-871-JPMDKKQ)  For a list of international numbers: https://save.org/find-help/international-resources/  Medicines:  You may need any of the following:  Anxiety or antidepressant medicine may help relieve or prevent anxiety. You may need to take the medicine for several weeks before you begin to feel better. Tell your healthcare provider about any side effects or problems you have with your medicine. The type or amount of medicine may need to be changed.  Take your medicine as directed. Contact your healthcare provider if you think your medicine is not helping or if you have side effects. Tell your provider if you are allergic to  any medicine. Keep a list of the medicines, vitamins, and herbs you take. Include the amounts, and when and why you take them. Bring the list or the pill bottles to follow-up visits. Carry your medicine list with you in case of an emergency.  Manage anxiety:  Talk to someone about your anxiety. Your healthcare provider may suggest counseling. You might feel more comfortable talking with a friend or family member about your anxiety. Choose someone you know will be supportive and encouraging.  Get regular physical activity. Physical activity can lower your stress, improve your mood, and help you sleep better. Work with your healthcare provider to develop a plan that you enjoy.     Create a regular sleep schedule. A routine can help you relax before bed. Listen to music, read, or do yoga. Try to go to bed and wake up at the same time every day. Sleep is important for emotional health.  Do activities you enjoy. Spend time with friends, or do something fun. Choose activities you are familiar with or comfortable doing. This may help prevent anxiety.  Practice deep breathing. Deep breathing can help you relax when you feel anxious. Focus on taking slow, deep breaths several times a day, or during an anxiety attack. Breathe in through your nose and out through your mouth. Deep breathing combined with meditation or listening to music may help you feel calmer.  Do not smoke. Nicotine and other chemicals in cigarettes and cigars can increase anxiety. Ask your healthcare provider for information if you currently smoke and need help to quit. E-cigarettes or smokeless tobacco still contain nicotine. Talk to your healthcare provider before you use these products.  Do not have caffeine. Caffeine can make your symptoms worse. Do not have foods or drinks that are meant to increase your energy level.  Do not drink alcohol or use drugs. Alcohol and drugs can worsen anxiety or make it hard to manage. Talk to your therapist or healthcare  provider if you need help to quit.